# Patient Record
Sex: FEMALE | Race: WHITE | NOT HISPANIC OR LATINO | Employment: FULL TIME | URBAN - METROPOLITAN AREA
[De-identification: names, ages, dates, MRNs, and addresses within clinical notes are randomized per-mention and may not be internally consistent; named-entity substitution may affect disease eponyms.]

---

## 2017-01-20 DIAGNOSIS — C79.9 SECONDARY MALIGNANT NEOPLASM (HCC): ICD-10-CM

## 2017-01-30 ENCOUNTER — ALLSCRIPTS OFFICE VISIT (OUTPATIENT)
Dept: OTHER | Facility: OTHER | Age: 57
End: 2017-01-30

## 2017-02-01 ENCOUNTER — ALLSCRIPTS OFFICE VISIT (OUTPATIENT)
Dept: OTHER | Facility: OTHER | Age: 57
End: 2017-02-01

## 2017-03-01 DIAGNOSIS — C79.9 SECONDARY MALIGNANT NEOPLASM (HCC): ICD-10-CM

## 2017-03-02 ENCOUNTER — ALLSCRIPTS OFFICE VISIT (OUTPATIENT)
Dept: OTHER | Facility: OTHER | Age: 57
End: 2017-03-02

## 2017-04-02 DIAGNOSIS — C77.9 SECONDARY AND UNSPECIFIED MALIGNANT NEOPLASM OF LYMPH NODE, UNSPECIFIED (HCC): ICD-10-CM

## 2017-04-05 ENCOUNTER — ALLSCRIPTS OFFICE VISIT (OUTPATIENT)
Dept: OTHER | Facility: OTHER | Age: 57
End: 2017-04-05

## 2017-05-03 ENCOUNTER — ALLSCRIPTS OFFICE VISIT (OUTPATIENT)
Dept: OTHER | Facility: OTHER | Age: 57
End: 2017-05-03

## 2017-05-05 DIAGNOSIS — Z79.899 OTHER LONG TERM (CURRENT) DRUG THERAPY: ICD-10-CM

## 2017-05-05 DIAGNOSIS — C79.9 SECONDARY MALIGNANT NEOPLASM (HCC): ICD-10-CM

## 2017-05-24 ENCOUNTER — GENERIC CONVERSION - ENCOUNTER (OUTPATIENT)
Dept: OTHER | Facility: OTHER | Age: 57
End: 2017-05-24

## 2017-05-25 ENCOUNTER — GENERIC CONVERSION - ENCOUNTER (OUTPATIENT)
Dept: OTHER | Facility: OTHER | Age: 57
End: 2017-05-25

## 2017-06-01 ENCOUNTER — ALLSCRIPTS OFFICE VISIT (OUTPATIENT)
Dept: OTHER | Facility: OTHER | Age: 57
End: 2017-06-01

## 2017-06-28 ENCOUNTER — ALLSCRIPTS OFFICE VISIT (OUTPATIENT)
Dept: OTHER | Facility: OTHER | Age: 57
End: 2017-06-28

## 2017-06-28 DIAGNOSIS — C79.9 SECONDARY MALIGNANT NEOPLASM (HCC): ICD-10-CM

## 2017-07-01 DIAGNOSIS — C77.9 SECONDARY AND UNSPECIFIED MALIGNANT NEOPLASM OF LYMPH NODE, UNSPECIFIED (HCC): ICD-10-CM

## 2017-08-02 ENCOUNTER — ALLSCRIPTS OFFICE VISIT (OUTPATIENT)
Dept: OTHER | Facility: OTHER | Age: 57
End: 2017-08-02

## 2017-08-28 ENCOUNTER — GENERIC CONVERSION - ENCOUNTER (OUTPATIENT)
Dept: OTHER | Facility: OTHER | Age: 57
End: 2017-08-28

## 2017-09-02 DIAGNOSIS — C77.9 SECONDARY AND UNSPECIFIED MALIGNANT NEOPLASM OF LYMPH NODE, UNSPECIFIED (HCC): ICD-10-CM

## 2017-09-02 DIAGNOSIS — C79.9 SECONDARY MALIGNANT NEOPLASM (HCC): ICD-10-CM

## 2017-09-29 ENCOUNTER — ALLSCRIPTS OFFICE VISIT (OUTPATIENT)
Dept: OTHER | Facility: OTHER | Age: 57
End: 2017-09-29

## 2017-10-29 DIAGNOSIS — C79.9 SECONDARY MALIGNANT NEOPLASM (HCC): ICD-10-CM

## 2017-11-03 ENCOUNTER — GENERIC CONVERSION - ENCOUNTER (OUTPATIENT)
Dept: OTHER | Facility: OTHER | Age: 57
End: 2017-11-03

## 2017-12-01 ENCOUNTER — GENERIC CONVERSION - ENCOUNTER (OUTPATIENT)
Dept: OTHER | Facility: OTHER | Age: 57
End: 2017-12-01

## 2017-12-03 DIAGNOSIS — C77.9 SECONDARY AND UNSPECIFIED MALIGNANT NEOPLASM OF LYMPH NODE, UNSPECIFIED (CODE): ICD-10-CM

## 2017-12-03 DIAGNOSIS — C79.9 SECONDARY MALIGNANT NEOPLASM (HCC): ICD-10-CM

## 2017-12-29 ENCOUNTER — GENERIC CONVERSION - ENCOUNTER (OUTPATIENT)
Dept: OTHER | Facility: OTHER | Age: 57
End: 2017-12-29

## 2018-01-10 NOTE — MISCELLANEOUS
Message  Pt and spouse received nurse teach on pembro/keytruda on monday 2/8  Discussed mechanism of action on the drug, s/e she may experience including fatigue, nausea, appetite changes, dry skin/changes, rash, autoimmune s/e such as pneumonitits, colitis, hypo/hyperthyroidism, hyperglycemia  lab tests that will be monitored throughout trmt including CBC, CMP, BUN/Cr, liver functions, TSH  Pt first appt is this wed 2/10 for 1st trmt  She signed consent and [employer] asst forms  All questions answered today at appt and pt/spouse instructed to call the office with any question/concerns that come up at home and any time during trmt  Active Problems    1  Metastatic melanoma to lymph node (196 9,172  9) (C77 9,C43  9)    Current Meds   1  Advil 200 MG Oral Tablet; Take 1 tablet 3 times daily as needed Recorded   2  AmLODIPine Besylate 5 MG Oral Tablet; TAKE 1 TABLET DAILY FOR BLOOD   PRESSURE Recorded   3  Ondansetron HCl - 8 MG Oral Tablet; 1 po q 8 hours as needed for nausea and vomitting; Therapy: 19GUA5765 to (Last Rx:25Jan2016)  Requested for: 95BZX4288 Ordered    Allergies    1   Sulfa Drugs    Signatures   Electronically signed by : Keagan Monroy, ; Feb 9 2016  9:20AM EST                       (Author)

## 2018-01-12 VITALS
HEIGHT: 65 IN | OXYGEN SATURATION: 99 % | RESPIRATION RATE: 16 BRPM | WEIGHT: 173.5 LBS | TEMPERATURE: 96.4 F | DIASTOLIC BLOOD PRESSURE: 80 MMHG | SYSTOLIC BLOOD PRESSURE: 138 MMHG | BODY MASS INDEX: 28.91 KG/M2 | HEART RATE: 70 BPM

## 2018-01-12 NOTE — MISCELLANEOUS
Message  called patient to remind her labs needed to be completed before her visit on 1/30/17 voicemail was left      Active Problems    1  High risk medication use (V58 69) (Z79 899)   2  Metastatic melanoma (172 9) (C79 9)   3  Metastatic melanoma to lymph node (196 9,172  9) (C77 9)    Current Meds   1  Advil 200 MG Oral Tablet; Take 1 tablet 3 times daily as needed Recorded   2  AmLODIPine Besylate 5 MG Oral Tablet; TAKE 1 TABLET DAILY FOR BLOOD   PRESSURE Recorded   3  Hydrocortisone 10 MG Oral Tablet; 2 TAB  QAM AND 1 TAB QPM;   Therapy: (Recorded:02Mar2016) to Recorded   4  Indomethacin 50 MG Oral Capsule; TAKE 1 CAPSULE 3 TIMES DAILY WITH FOOD AS   NEEDED; Therapy: 08Aug2016 to (Chris Avendano)  Requested for: 52Elz0584; Last   Rx:08Aug2016 Ordered   5  Levothyroxine Sodium 100 MCG Oral Tablet; Take 1 tablet daily as directed Recorded   6  LORazepam 0 5 MG Oral Tablet (Ativan); TAKE 1 TABLET EVERY 4 TO 6 HOURS AS   NEEDED FOR NAUSEA; Therapy: 36LLS0374 to (Evaluate:01Mar2016); Last Rx:56Xca6638 Ordered   7  Magnesium CAPS; take 1 capsule daily; Therapy: (Penny Dyson) to Recorded   8  Mekinist 0 5 MG TABS; Take two tablets by mouth daily every other day one hour before   meal or two hours after meals; Therapy: 08Aug2016 to (Evaluate:78Oub5154)  Requested for: 69QSR0816; Last   Rx:17Jan2017 Ordered   9  Ondansetron HCl - 8 MG Oral Tablet; 1 po q 8 hours as needed for nausea and vomitting; Therapy: 42DZO6139 to (Last Rx:25Jan2016)  Requested for: 12YSW0215 Ordered   10  Pantoprazole Sodium 40 MG Oral Tablet Delayed Release; TAKE 1 TABLET 30    MINUTES BEFORE BREAKFAST DAILY; Therapy: (Recorded:68Dqh0125) to Recorded   11  Prochlorperazine Maleate 10 MG Oral Tablet; TAKE 1 TABLET EVERY 6 HOURS AS    NEEDED; Therapy: 96Riq5372 to (Evaluate:76Qwb2029)  Requested for: 05Dko6419; Last    Rx:70Ibc5006 Ordered   12  Tafinlar 75 MG CAPS; Take two capsules every 12 hrs every other day   One hour before    or two hours after meals; Therapy: 83Xgg6811 to (Last Rx:17Jan2017)  Requested for: 80NNW4590 Ordered    Allergies    1   Sulfa Drugs    Signatures   Electronically signed by : Erlin Beltran; Jan 26 2017  1:43PM EST                       (Author)

## 2018-01-13 VITALS
BODY MASS INDEX: 27.16 KG/M2 | HEIGHT: 65 IN | TEMPERATURE: 96.9 F | RESPIRATION RATE: 16 BRPM | HEART RATE: 80 BPM | WEIGHT: 163 LBS | DIASTOLIC BLOOD PRESSURE: 80 MMHG | OXYGEN SATURATION: 96 % | SYSTOLIC BLOOD PRESSURE: 118 MMHG

## 2018-01-13 VITALS
WEIGHT: 176 LBS | BODY MASS INDEX: 29.32 KG/M2 | OXYGEN SATURATION: 98 % | HEIGHT: 65 IN | SYSTOLIC BLOOD PRESSURE: 146 MMHG | TEMPERATURE: 97.6 F | HEART RATE: 84 BPM | RESPIRATION RATE: 16 BRPM | DIASTOLIC BLOOD PRESSURE: 82 MMHG

## 2018-01-13 VITALS
HEIGHT: 65 IN | DIASTOLIC BLOOD PRESSURE: 68 MMHG | SYSTOLIC BLOOD PRESSURE: 122 MMHG | OXYGEN SATURATION: 99 % | WEIGHT: 176 LBS | HEART RATE: 77 BPM | TEMPERATURE: 96.2 F | RESPIRATION RATE: 16 BRPM | BODY MASS INDEX: 29.32 KG/M2

## 2018-01-14 VITALS
HEIGHT: 65 IN | RESPIRATION RATE: 18 BRPM | BODY MASS INDEX: 27.38 KG/M2 | SYSTOLIC BLOOD PRESSURE: 130 MMHG | DIASTOLIC BLOOD PRESSURE: 78 MMHG | WEIGHT: 164.31 LBS | OXYGEN SATURATION: 99 % | HEART RATE: 76 BPM | TEMPERATURE: 96.1 F

## 2018-01-14 VITALS
DIASTOLIC BLOOD PRESSURE: 82 MMHG | TEMPERATURE: 97.1 F | RESPIRATION RATE: 18 BRPM | SYSTOLIC BLOOD PRESSURE: 126 MMHG | OXYGEN SATURATION: 98 % | WEIGHT: 169 LBS | HEART RATE: 74 BPM | HEIGHT: 65 IN | BODY MASS INDEX: 28.16 KG/M2

## 2018-01-14 VITALS
TEMPERATURE: 96.1 F | HEART RATE: 80 BPM | HEIGHT: 65 IN | WEIGHT: 175.5 LBS | OXYGEN SATURATION: 99 % | DIASTOLIC BLOOD PRESSURE: 74 MMHG | BODY MASS INDEX: 29.24 KG/M2 | RESPIRATION RATE: 16 BRPM | SYSTOLIC BLOOD PRESSURE: 122 MMHG

## 2018-01-15 VITALS
SYSTOLIC BLOOD PRESSURE: 124 MMHG | BODY MASS INDEX: 28.99 KG/M2 | HEIGHT: 65 IN | TEMPERATURE: 96.5 F | HEART RATE: 70 BPM | RESPIRATION RATE: 16 BRPM | WEIGHT: 174 LBS | OXYGEN SATURATION: 98 % | DIASTOLIC BLOOD PRESSURE: 78 MMHG

## 2018-01-16 NOTE — MISCELLANEOUS
Message  Linus Trinidad was admitted to [address] [place] [address] ICU overnight, physicians from [place] called to update Dr Ashwini Draper today  She started with fevers over the last couple days and aches  She fell at home and had some n/v last night  She was hypotensive and admitted to ICU, started on epinephrine drip   bret called this afternoon to make sure we were aware  He asked that we call his cell if need be, he will keep us informed  Active Problems    1  High risk medication use (V58 69) (Z79 899)   2  Metastatic melanoma (172 9) (C79 9)   3  Metastatic melanoma to lymph node (196 9,172  9) (C77 9)    Current Meds   1  Advil 200 MG Oral Tablet; Take 1 tablet 3 times daily as needed Recorded   2  AmLODIPine Besylate 5 MG Oral Tablet; TAKE 1 TABLET DAILY FOR BLOOD   PRESSURE Recorded   3  Hydrocortisone 10 MG Oral Tablet; 2 TAB  QAM AND 1 TAB QPM;   Therapy: (Recorded:02Mar2016) to Recorded   4  Indomethacin 50 MG Oral Capsule; TAKE 1 CAPSULE 3 TIMES DAILY WITH FOOD AS   NEEDED; Therapy: 54Crn3552 to (77 [phone])  Requested for: 49Yxd3355; Last   Rx:61Gxi1741 Ordered   5  Levothyroxine Sodium 100 MCG Oral Tablet; Take 1 tablet daily as directed Recorded   6  LORazepam 0 5 MG Oral Tablet (Ativan); TAKE 1 TABLET EVERY 4 TO 6 HOURS AS   NEEDED FOR NAUSEA; Therapy: 81OBJ0188 to (Evaluate:01Mar2016); Last Rx:84Qum9348 Ordered   7  Mekinist 2 MG TABS; TAKE 1 TABLET Daily take 1 hour before or 2 hours after meals; Therapy: 55Dvw7441 to (Evaluate:07Sep2016); Last Rx:49Xvw8285 Ordered   8  Ondansetron HCl - 8 MG Oral Tablet; 1 po q 8 hours as needed for nausea and vomitting; Therapy: 76SCD6569 to (Last Rx:25Jan2016)  Requested for: 02OQJ9989 Ordered   9  Pantoprazole Sodium 40 MG Oral Tablet Delayed Release; TAKE 1 TABLET 30   MINUTES BEFORE BREAKFAST DAILY; Therapy: (Recorded:33Sww8957) to Recorded   10  Prochlorperazine Maleate 10 MG Oral Tablet; TAKE 1 TABLET EVERY 6 HOURS AS    NEEDED;     Therapy: 50Ogn3059 to (Evaluate:17Lgn0127)  Requested for: 57Nzs0722; Last    Rx:82Hbw4907 Ordered   11  Tafinlar 75 MG CAPS; Take one capsule every 12 hrs by mouth  One hour before or two    hours after meals; Therapy: 52Zgx9258 to (Last Rx:21Zec9505) Ordered    Allergies    1   Sulfa Drugs    Signatures   Electronically signed by : Tali Maza, ; Aug 23 2016  3:33PM EST                       (Author)

## 2018-01-16 NOTE — MISCELLANEOUS
Message  Shila Perez or spouse, Mimi Albright will call me on monday 10/10 to let us know if she will be starting back on the BRAF combo or when she plans to start back up  She will go back on the Tafinlar 75mg BID every other day and Mekinist 1mg once/day every other day  They will stay in touch regarding any symptoms/side effects  Active Problems    1  High risk medication use (V58 69) (Z79 899)   2  Metastatic melanoma (172 9) (C79 9)   3  Metastatic melanoma to lymph node (196 9,172  9) (C77 9)    Current Meds   1  Advil 200 MG Oral Tablet; Take 1 tablet 3 times daily as needed Recorded   2  AmLODIPine Besylate 5 MG Oral Tablet; TAKE 1 TABLET DAILY FOR BLOOD   PRESSURE Recorded   3  Cotellic 20 MG Oral Tablet; TAKE 3 TABLET Daily; Therapy: 57AIX1420 to (Evaluate:07Oct2016); Last Rx:07Sep2016 Ordered   4  Hydrocortisone 10 MG Oral Tablet; 2 TAB  QAM AND 1 TAB QPM;   Therapy: (Recorded:02Mar2016) to Recorded   5  Indomethacin 50 MG Oral Capsule; TAKE 1 CAPSULE 3 TIMES DAILY WITH FOOD AS   NEEDED; Therapy: 59Mjv3476 to ([phone])  Requested for: 56Fxe5702; Last   Rx:61Xgg1105 Ordered   6  Levothyroxine Sodium 100 MCG Oral Tablet; Take 1 tablet daily as directed Recorded   7  LORazepam 0 5 MG Oral Tablet (Ativan); TAKE 1 TABLET EVERY 4 TO 6 HOURS AS   NEEDED FOR NAUSEA; Therapy: 61YRB5769 to (Evaluate:01Mar2016); Last Rx:15Otd4062 Ordered   8  Magnesium CAPS; take 1 capsule daily; Therapy: (Maura Qiu) to Recorded   9  Mekinist 2 MG TABS; TAKE 1 TABLET Daily take 1 hour before or 2 hours after meals; Therapy: 90Yrl2824 to (Evaluate:07Sep2016)  Requested for: 58Cyl3635; Last   Rx:48Arp0400; Status: ACTIVE - Renewal Denied Ordered   10  Ondansetron HCl - 8 MG Oral Tablet; 1 po q 8 hours as needed for nausea and vomitting; Therapy: 77JMI0158 to (Last Rx:25Jan2016)  Requested for: 33TJG5034 Ordered   11   Pantoprazole Sodium 40 MG Oral Tablet Delayed Release; TAKE 1 TABLET 30    MINUTES BEFORE BREAKFAST DAILY; Therapy: (Recorded:83Vin9089) to Recorded   12  Prochlorperazine Maleate 10 MG Oral Tablet; TAKE 1 TABLET EVERY 6 HOURS AS    NEEDED; Therapy: 08Aug2016 to (Evaluate:07Sep2016)  Requested for: 08Aug2016; Last    Rx:52Xbc0867 Ordered   13  Tafinlar 50 MG CAPS; Take 2 capsules by mouth every 12 hours  One hour before meal    or two hours after meals; Therapy: 08Aug2016 to (Last Rx:02Sep2016) Ordered   14  Zelboraf 240 MG Oral Tablet; TAKE 4 TABLET Every twelve hours; Therapy: 32RAT4355 to (Evaluate:07Oct2016); Last Rx:07Sep2016 Ordered    Allergies    1   Sulfa Drugs    Signatures   Electronically signed by : Lauri Vail, ; Oct  5 2016  1:33PM EST                       (Author)

## 2018-01-16 NOTE — PROGRESS NOTES
Assessment    1  Metastatic melanoma to lymph node (196 9,172  9) (C77 9,C43  9)    Plan  Metastatic melanoma to lymph node    · Follow-up visit in 2 weeks Evaluation and Treatment  Follow-up  Status: Hold For -  Scheduling  Requested for: 84KOB5805   Ordered; For: Metastatic melanoma to lymph node; Ordered By: Jessica Canchola Performed:  Due: 31MFT2243                (1) PHOSPHORUS; Status:Active; Requested for:71Guw5099;   Perform:[facility]; XH:12APM8035;FGHXNLI; For:Metastatic melanoma to lymph node; Ordered By:Anitra Garcia;      Discussion/Summary  Discussion Summary:   Ms Sedrick Rivas is a 48year-old female with stage III see malignant melanoma of the right lower extremity, status post wide excision, with a sentinel lymph node biopsy, followed by a right inguinal dissection  Tyra Ceron, our clinical trials nurse, saw her with me  She would be due for week 13 of treatment  She recently had a PET/CT which, unfortunately, showed diffuse disease progression in her lung, mediastinal/hilar adenopathy, abdomen, right medial tibia, and bilateral lower femurs  We will need to take her off of the clinical trial and will see whether she was randomized to Ipilimumab or Nivolumab  We would then treat her with whichever drug she was not getting on the clinical trial  This was discussed at great length with her and her   All of her questions were answered  She will tentatively follow up with us in 2 weeks  Chief Complaint  Chief Complaint: Chief Complaint:   The patient presents to the office today with Metastatic melanoma to lymph node  Chief Complaint Free Text Note Form: Metastatic melanoma to lymph node      History of Present Illness  Referring Provider: Dr Kiley Kathleen   HPI: Mrs Sedrick Rivas is a 48year-old female who presents to us for possible participation in our adjuvant clinical trial of Ipilimumab vs  Nivolumab   Mrs Sedrick Rivas initially noticed a mole on her foot in December, however she felt it was a regular mole at that time  By spring the mole had grown and changed in appearance  She also had a few episodes where 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 had 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 causing the lesion to bleed  She presented to her dermatologist who debrided and resected the lesion  This was positive for malignant melanoma and had a Jimbo's level IV lesion with maximum thickness of 2 35 mm, it was ulcerated, and had a mitotic rate of 13 mm/m2  There was no evidence of lymphovascular invasion  On 7/28/15 she had a wide excision with sentinel lymph node biopsy 1/2 lymph nodes were positive  She subsequently had a PET/CT on 8/5/15, which did not reveal any distant metastatic disease, however she did have a hypermetabolic right popliteal fossa lymph node  On 8/13/15 she had a right groin lymph node dissection, 1/6 of the superficial groin nodes was positive for macrometastatic disease  All other lymph nodes removed were negative  The patient has stage IIIC disease  he patient was in the process of being worked up for the [facility] [id number] clinical trial looking a Ipilimumab vs  Nivolumab in the adjuvant setting as she does have stage IIIC disease  A PET/CT scan, part of the staging requirements for the clinical trial, showed hypermetabolic activity posterior to the distal right femur  On 10/1/15 She had a resection of the femoral deep nodule and it was consistent with malignant melanoma  Previous Therapy:   7/9/15 resection of lesion revealing malignant melanoma, Jimbo'es level IV lesion maximum thickness of 2 35 mm, ulcerated, mitotic rate of 13 mm/m2  7/28/15 wide excision of foot and right groin sentinel lymph node biopsy 1/2 lymph nodes were positive  8/13/15: right groin lymph node dissection 1/6 right superficial groin nodes were positive   All other nodes were negative  10/1/15 Radical resection of malignant soft tissue neoplasm of distal deep femoral region   Current Therapy: [facility] [place] [facility] 209-238 adjuvant clinical trial with Ipilimumab vs  Nivolumab, first treatment was 11/2/15   Disease Status: Stage IIIC, [id number], significant disease progression   Interval History: Ms Maude Lemus is a 48year old female with a stage IIIc melanoma of the right lower extremity, status post wide excision right inguinal sentinel lymph node biopsy on 7/28/15, 1/2 lymph nodes were positive  Overall Ms Maude Lemus is doing well  She has a good energy level with an ECOG performance status of one  She has been sick for the past week now, she is getting better, but did have chills, pyrexia, muscles cramping  Her pyrexia has resolved  She also has not had much of an appetite and she has not been eating  She is slightly nauseated but has not been taking anything  She also notes 2 episodes of diarrhea on Saturday morning, she took Imodium and the diarrhea resolved  She otherwise denies fevers, chills, CP, SOB, N/V, diarrhea, all other ROS are unremarkable  Tumor Markers: BRAF not detected      Review of Systems  Complete-Female:   Constitutional: fever, recent ~Ulb weight gain, chills, recent ~Ulb weight loss, loss of appetite and night sweats, but as noted in HPI and not feeling tired  Eyes: No complaints of eye pain, no red eyes, no eyesight problems, no discharge, no dry eyes, no itching of eyes  ENT: no complaints of earache, no loss of hearing, no nose bleeds, no nasal discharge, no sore throat, no hoarseness  Cardiovascular: No complaints of slow heart rate, no fast heart rate, no chest pain, no palpitations, no leg claudication, no lower extremity edema  Respiratory: No complaints of shortness of breath, no wheezing, no cough, no SOB on exertion, no orthopnea, no PND  Gastrointestinal: nausea and diarrhea, but as noted in HPI  Genitourinary: No complaints of dysuria, no incontinence, no pelvic pain, no dysmenorrhea, no vaginal discharge or bleeding     Musculoskeletal: limb pain and surgical site, but No complaints of arthralgias, no myalgias, no joint swelling or stiffness, no limb pain or swelling  Integumentary: itching and bilateral temples/forehead, but as noted in HPI  Neurological: dizziness, but No complaints of headache, no confusion, no convulsions, no numbness, no dizziness or fainting, no tingling, no limb weakness, no difficulty walking and no limb weakness  Psychiatric: Not suicidal, no sleep disturbance, no anxiety or depression, no change in personality, no emotional problems  Endocrine: hot flashes, but No complaints of proptosis, no hot flashes, no muscle weakness, no deepening of the voice, no feelings of weakness  Hematologic/Lymphatic: a tendency for easy bruising, but No complaints of swollen glands, no swollen glands in the neck, does not bleed easily, does not bruise easily  Other Symptoms: Bilateral hip pain  ROS Reviewed:   ROS reviewed  Active Problems    1  Metastatic melanoma to lymph node (196 9,172  9) (C77 9,C43  9)    Past Medical History    1  History of hypertension (V12 59) (Z86 79)   2  History of Palpitation (785 1) (R00 2)    Surgical History    1  History of  Section   2  History of Cholecystotomy   3  History of Complete Colonoscopy   4  History of Leg Repair  Surgical History Reviewed: The surgical history was reviewed and updated today  Family History    1  Family history of diabetes mellitus (V18 0) (Z83 3)    2  Family history of cardiac disorder (V17 49) (Z82 49)   3  Family history of cerebrovascular accident (V17 1) (Z82 3)   4  Family history of hypertension (V17 49) (Z82 49)  Family History Reviewed: The family history was reviewed and updated today  Social History    · Current every day smoker (305 1) (F17 200)   · Social alcohol use (F10 99)  Social History Reviewed: The social history was reviewed and updated today  The social history was reviewed and is unchanged  Current Meds   1  Advil 200 MG Oral Tablet;  Take 1 tablet 3 times daily as needed Recorded 2  AmLODIPine Besylate 5 MG Oral Tablet; TAKE 1 TABLET DAILY FOR BLOOD PRESSURE   Recorded  Medication List Reviewed: The medication list was reviewed and updated today  Allergies    1  Sulfa Drugs    Vitals  Vital Signs [Data Includes: Current Encounter]    Recorded: 21CXQ0378 09:52AM   Temperature 97 1 F   Heart Rate 79   Respiration 18   Systolic 238   Diastolic 80   Height 5 ft 5 in   Weight 174 lb 4 96 oz   BMI Calculated 29 01   BSA Calculated 1 87   O2 Saturation 98   Pain Scale 0     Physical Exam    Constitutional   General appearance: No acute distress, well appearing and well nourished  Eyes   Conjunctiva and lids: No swelling, erythema or discharge  Pupils and irises: Equal, round and reactive to light  Ears, Nose, Mouth, and Throat   External inspection of ears and nose: Normal     Nasal mucosa, septum, and turbinates: Normal without edema or erythema  Oropharynx: Normal with no erythema, edema, exudate or lesions  Pulmonary   Respiratory effort: No increased work of breathing or signs of respiratory distress  Auscultation of lungs: Clear to auscultation  Cardiovascular   Palpation of heart: Normal PMI, no thrills  Auscultation of heart: Normal rate and rhythm, normal S1 and S2, without murmurs  Examination of extremities for edema and/or varicosities: Normal   Right leg lymphedema, stable  Abdomen   Abdomen: Non-tender, no masses  Liver and spleen: No hepatomegaly or splenomegaly  Lymphatic   Palpation of lymph nodes in neck: No lymphadenopathy  Musculoskeletal   Gait and station: Normal     Digits and nails: Normal without clubbing or cyanosis  Inspection/palpation of joints, bones, and muscles: Normal     Skin   Skin and subcutaneous tissue: Abnormal   Many scars and surgical incisions on the right leg  Neurologic   Cranial nerves: Cranial nerves 2-12 intact  Sensation: No sensory loss      Psychiatric   Orientation to person, place, and time: Normal     Mood and affect: Normal         ECOG 0       Results/Data  Results Form:   Results   Radiology 1/19/16 MRI of brain: stable scattered white matter lesions within the cerebral hemispheres, non-specific for the patient's age  No new lesions are identified  Lab Results 12/28/15 labs were reviewed TSH was slightly elevated at 5 952  PET 1/18/16: New bilateral lung nodules most compatible with metastases  New extensive hypermetabolic hilar and mediastinal adenopathy  Mildly hypermetabolic cervical nodes are also concerning for metastases given the extent of new disease elsewhere in the body  New hypermetabolic lesion in the upper abdomen most compatible with metastasis  Significantly increased subcutaneous activity along the medial right tibia region, most concerning for tumor recurrence  Additional increased FDG activity is seen in the lower extremities as above  Increased subcutaneous activity along the forearms bilaterally maybe correlated clinically  Increased activity in the distal femurs bilaterally and region of the left 1st anterior costochondral junction along the manubrium  Signatures   Electronically signed by :  ENOC Mera ; Jan 25 2016 10:50AM EST                       (Author)

## 2018-01-22 VITALS
TEMPERATURE: 96.2 F | SYSTOLIC BLOOD PRESSURE: 122 MMHG | RESPIRATION RATE: 16 BRPM | HEIGHT: 65 IN | HEART RATE: 71 BPM | OXYGEN SATURATION: 96 % | DIASTOLIC BLOOD PRESSURE: 82 MMHG | WEIGHT: 171.5 LBS | BODY MASS INDEX: 28.57 KG/M2

## 2018-01-22 VITALS
DIASTOLIC BLOOD PRESSURE: 76 MMHG | TEMPERATURE: 95.9 F | HEART RATE: 77 BPM | RESPIRATION RATE: 16 BRPM | OXYGEN SATURATION: 95 % | WEIGHT: 175 LBS | HEIGHT: 65 IN | BODY MASS INDEX: 29.16 KG/M2 | SYSTOLIC BLOOD PRESSURE: 122 MMHG

## 2018-01-24 VITALS
WEIGHT: 171.5 LBS | BODY MASS INDEX: 28.57 KG/M2 | HEIGHT: 65 IN | SYSTOLIC BLOOD PRESSURE: 110 MMHG | RESPIRATION RATE: 16 BRPM | HEART RATE: 74 BPM | DIASTOLIC BLOOD PRESSURE: 68 MMHG | OXYGEN SATURATION: 98 % | TEMPERATURE: 96.4 F

## 2018-01-24 VITALS
WEIGHT: 172 LBS | TEMPERATURE: 96.5 F | SYSTOLIC BLOOD PRESSURE: 128 MMHG | OXYGEN SATURATION: 96 % | RESPIRATION RATE: 16 BRPM | HEIGHT: 65 IN | BODY MASS INDEX: 28.66 KG/M2 | HEART RATE: 85 BPM | DIASTOLIC BLOOD PRESSURE: 68 MMHG

## 2018-01-29 DIAGNOSIS — C77.9 SECONDARY AND UNSPECIFIED MALIGNANT NEOPLASM OF LYMPH NODE, UNSPECIFIED (CODE): ICD-10-CM

## 2018-01-31 NOTE — PROGRESS NOTES
Hematology/Oncology Outpatient Follow- up Note  Roger Cesar 65 y o  female MRN: @ Encounter: [id number]        Date:  1/31/2018        Assessment / Plan:    1  Metastatic Melanoma: currently on Dabrafenib 150 mg BID along with Mekinist 1 mg PO daily every other day  Thus far she has tolerated this regimen well (she had trouble with full dose BRAF/MEK combination- had fevers, arthralgias, etc )  She will be due for repeat imaging this month (we have decided to scan her every 3 months), which I have ordered  Her labs were reviewed and are sufficient for treatment  2  Adrenal insufficiency: being followed by [place] [facility]: she is currently on Hydrocortisone- 30 mg        Subjective:   CC: follow up regarding metastatic melanoma      HPI:    Mrs Cristina Mohr is a 65year-old female who presents to us for possible participation in our adjuvant clinical trial of Ipilimumab vs  Nivolumab  Mrs Cristina Mohr initially noticed a mole on her foot in December, however she felt it was a regular mole at that time  By spring the mole had grown and changed in appearance  She also had a few episodes where 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 had 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 causing the lesion to bleed  She presented to her dermatologist who debrided and resected the lesion  This was positive for malignant melanoma and had a Jimbo's level IV lesion with maximum thickness of 2 35 mm, it was ulcerated, and had a mitotic rate of 13 mm/m2  There was no evidence of lymphovascular invasion  On 7/28/15 she had a wide excision with sentinel lymph node biopsy 1/2 lymph nodes were positive  She subsequently had a PET/CT on 8/5/15, which did not reveal any distant metastatic disease, however she did have a hypermetabolic right popliteal fossa lymph node  On 8/13/15 she had a right groin lymph node dissection, 1/6 of the superficial groin nodes was positive for macrometastatic disease  All other lymph nodes removed were negative  The patient has stage IIIC disease  he patient was in the process of being worked up for the [facility] 209-238 clinical trial looking a Ipilimumab vs  Nivolumab in the adjuvant setting as she does have stage IIIC disease  A PET/CT scan, part of the staging requirements for the clinical trial, showed hypermetabolic activity posterior to the distal right femur  On 10/1/15 She had a resection of the femoral deep nodule and it was consistent with malignant melanoma  The patient was found to have recurrent disease in January 2016, she was unblinded from the [facility] 209-238 clinical trial and was randomized to Ipilimumab  She started on Pembrolizumab on 2/10/16  She was found to have disease progression again and started treatment with Dabrafenib and Mekinist on 8/8/16, she unfortunately had a lot of issues with pyrexia  We switched her to Vemurafenib along with Cotellic on 0/27/40  She developed high fevers, rash, nausea, and vomiting  In October 2016 we switched her back to Dabrafenib 75 mg BID along with Mekinist 1 mg PO every other day  In November 2016  Interval History:    Overall she is doing well  She has a good energy level with an ECOG performance status of zero  She has a good appetite and has not lost any weight  She did have sinusitis a few weeks ago, which has since resolved  She has a full ache in her left femur/groin that radiates to her knee  It has come and gone since October  She has not needed to take pain medication  She otherwise denies fevers, chills, CP, SOB ,N/V, diarrhea, all other ROS are unremarkable  PFSH was reviewed        Cancer Staging: metastatic melanoma to her lung, liver    BRAF: mutated    Previous Hematologic/ Oncologic History:    7/9/15 resection of lesion revealing malignant melanoma, Jimbo'es level IV lesion maximum thickness of 2 35 mm, ulcerated, mitotic rate of 13 mm/m2  1  7/28/15 wide excision of foot and right groin sentinel lymph node biopsy 1/2 lymph nodes were positive  2  8/13/15: right groin lymph node dissection 1/6 right superficial groin nodes were positive  All other nodes were negative  3  10/1/15 Radical resection of malignant soft tissue neoplasm of distal deep femoral region  4  -238 adjuvant clinical trial with Ipilimumab vs  Nivolumab, first treatment was 11/2/15, randomization was broken in January 2016, she was receiving Ipilimumab  5  2/12/16 adrenal insufficiency and hypophysitis- started hydrocortisone 20 mg in the am, 10 mg in the pm  Pembrolizumab 2 mg/kg every three weeks, first treatment was on 2/10/16, last dose was on 7/18/16  6  STarted Dabrafenib 150 mg every 12 hours PO along with Mekinist 2 mg PO daily on 8/8/16, stopped on 8/22/16, resumed on 9/2/16 at dose reduction: Dabrafenib 75 mg BID along with Mekinist 2 mg PO daily, drug stopped on 9/3/16 secondary to nausea, vomiting, and pyrexia  7  STarted Vemurafenib 960 mg PO every 12 hours along with Cotellic 60 mg PO daily three weeks on and one week off on 9/16/16, stopped on 9/27/16 secondary to nausea, vomiting, grade 4 maculopapular rash, and high fevers  8 October 24, 2016 Started Dabrafenib 75 mg BID, Mekinist 1 mg PO every other day    Current Hematologic/ Oncologic Treatment:      Dabrafenib 150 mg BID plus Mekinist 1 mg PO daily every other day, started this regimen in November 2016        Test Results:    Imaging: No results found      Labs:   Lab Results   Component Value Date    WBC 7 50 08/08/2016    HGB 13 3 08/08/2016    HCT 41 3 08/08/2016    MCV 89 08/08/2016     08/08/2016     Lab Results   Component Value Date     08/08/2016    K 3 8 08/08/2016     08/08/2016    CO2 33 (H) 08/08/2016    ANIONGAP 8 08/08/2016    BUN 5 08/08/2016    CREATININE 0 65 08/08/2016    GLUCOSE 100 08/08/2016    CALCIUM 9 2 08/08/2016    AST 12 08/08/2016    ALT 14 08/08/2016    ALKPHOS 83 08/08/2016    PROT 7 2 08/08/2016    BILITOT 0 50 08/08/2016    EGFR >60 0 08/08/2016         No results found for: SPEP, UPEP    No results found for: PSA    No results found for: CEA    No results found for:     No results found for: AFP    No results found for: IRON, TIBC, FERRITIN    No results found for: [id number]      ROS: Review of Systems   Constitutional: Negative  HENT: Negative  Eyes: Negative  Respiratory: Negative  Cardiovascular: Negative  Gastrointestinal: Negative  Endocrine: Negative  Genitourinary: Negative  Musculoskeletal: Negative  Skin: Negative  Allergic/Immunologic: Negative  Neurological: Negative  Hematological: Negative  Psychiatric/Behavioral: Negative  Current Medications: Reviewed  Allergies: Reviewed  PMH/FH/SH:  Reviewed      Physical Exam:    There is no height or weight on file to calculate BSA  Wt Readings from Last 3 Encounters:   12/29/17 78 kg (172 lb)   12/01/17 77 8 kg (171 lb 8 oz)   11/03/17 77 8 kg (171 lb 8 oz)        Temp Readings from Last 3 Encounters:   12/29/17 (!) 96 5 °F (35 8 °C)   12/01/17 (!) 96 4 °F (35 8 °C)   11/03/17 (!) 96 2 °F (35 7 °C)        BP Readings from Last 3 Encounters:   12/29/17 128/68   12/01/17 110/68   11/03/17 122/82         Pulse Readings from Last 3 Encounters:   12/29/17 85   12/01/17 74   11/03/17 71     @LASTSAO2(3)@      Physical Exam   Constitutional: She is oriented to person, place, and time  She appears well-developed and well-nourished  HENT:   Head: Normocephalic and atraumatic  Nose: Nose normal    Mouth/Throat: Oropharynx is clear and moist    Eyes: Conjunctivae and EOM are normal  Pupils are equal, round, and reactive to light  Neck: Normal range of motion  Neck supple  Cardiovascular: Normal rate, regular rhythm and normal heart sounds  Pulmonary/Chest: Effort normal and breath sounds normal    Abdominal: Soft  Bowel sounds are normal    Musculoskeletal: Normal range of motion  Neurological: She is alert and oriented to person, place, and time  She has normal reflexes  Skin: Skin is warm and dry  Psychiatric: She has a normal mood and affect  Judgment normal    Vitals reviewed  Goals and Barriers:  Current Goal:  Prolong Survival from Cancer  Barriers: None  Patient's Capacity to Self Care:  Patient fully able to self care      Code Status: [unfilled]  Advance Directive and Living Will:      Power of :

## 2018-02-02 ENCOUNTER — OFFICE VISIT (OUTPATIENT)
Dept: HEMATOLOGY ONCOLOGY | Facility: CLINIC | Age: 58
End: 2018-02-02
Payer: COMMERCIAL

## 2018-02-02 VITALS
WEIGHT: 173 LBS | HEIGHT: 64 IN | RESPIRATION RATE: 14 BRPM | BODY MASS INDEX: 29.53 KG/M2 | OXYGEN SATURATION: 98 % | TEMPERATURE: 97.7 F | SYSTOLIC BLOOD PRESSURE: 132 MMHG | HEART RATE: 68 BPM | DIASTOLIC BLOOD PRESSURE: 80 MMHG

## 2018-02-02 DIAGNOSIS — E27.1 ADRENAL INSUFFICIENCY, PRIMARY, AUTOIMMUNE (HCC): ICD-10-CM

## 2018-02-02 DIAGNOSIS — C79.9 METASTATIC MELANOMA (HCC): Primary | ICD-10-CM

## 2018-02-02 PROCEDURE — 99215 OFFICE O/P EST HI 40 MIN: CPT | Performed by: PHYSICIAN ASSISTANT

## 2018-03-01 NOTE — PROGRESS NOTES
Hematology/Oncology Outpatient Follow- up Note  Ana Kaur 65 y o  female MRN: @ Encounter: [id number]        Date:  3/1/2018        Assessment / Plan:    1  Metastatic Melanoma: currently on Dabrafenib 150 mg BID along with Mekinist 1 mg PO daily every other day  Thus far she has tolerated this regimen well (she had trouble with full dose BRAF/MEK combination- had fevers, arthralgias, etc )  She had repeat imaging on 2/20/18, which continues to show stable disease  Her next set of scans will be in May  Her labs were reviewed and are sufficient for treatment  She will follow up with us in one month with a CBC, CMP, and LDH  2  Adrenal insufficiency: being followed by [place] [facility]: she is currently on Hydrocortisone- 30 mg        Subjective:   CC: follow up regarding metastatic melanoma      HPI:    Mrs Samara iVgil is a 65year-old female who presents to us for possible participation in our adjuvant clinical trial of Ipilimumab vs  Nivolumab  Mrs Samara Vigil initially noticed a mole on her foot in December, however she felt it was a regular mole at that time  By spring the mole had grown and changed in appearance  She also had a few episodes where 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 had 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 causing the lesion to bleed  She presented to her dermatologist who debrided and resected the lesion  This was positive for malignant melanoma and had a Jimbo's level IV lesion with maximum thickness of 2 35 mm, it was ulcerated, and had a mitotic rate of 13 mm/m2  There was no evidence of lymphovascular invasion  On 7/28/15 she had a wide excision with sentinel lymph node biopsy 1/2 lymph nodes were positive  She subsequently had a PET/CT on 8/5/15, which did not reveal any distant metastatic disease, however she did have a hypermetabolic right popliteal fossa lymph node  On 8/13/15 she had a right groin lymph node dissection, 1/6 of the superficial groin nodes was positive for macrometastatic disease   All other lymph nodes removed were negative  The patient has stage IIIC disease  he patient was in the process of being worked up for the [facility] 209-238 clinical trial looking a Ipilimumab vs  Nivolumab in the adjuvant setting as she does have stage IIIC disease  A PET/CT scan, part of the staging requirements for the clinical trial, showed hypermetabolic activity posterior to the distal right femur  On 10/1/15 She had a resection of the femoral deep nodule and it was consistent with malignant melanoma  The patient was found to have recurrent disease in January 2016, she was unblinded from the [facility] 209-238 clinical trial and was randomized to Ipilimumab  She started on Pembrolizumab on 2/10/16  She was found to have disease progression again and started treatment with Dabrafenib and Mekinist on 8/8/16, she unfortunately had a lot of issues with pyrexia  We switched her to Vemurafenib along with Cotellic on 7/10/53  She developed high fevers, rash, nausea, and vomiting  In October 2016 we switched her back to Dabrafenib 75 mg BID along with Mekinist 1 mg PO every other day  In November 2016  Interval History:    Overall Chad Byrne is doing well  She has a good energy level with an ECOG performance status of zero  She has a good appetite and her weight has jenn stable  She reports that her prior thigh pain is better, she started wearing her orthotics, which has helped  She denies fevers, chills, CP, SOb, N/V, diarrhea, all other ROS are unremarkable  PFSH was reviewed  Cancer Staging:   metastatic melanoma to her lung, liver    BRAF: mutated    Previous Hematologic/ Oncologic History:    7/9/15 resection of lesion revealing malignant melanoma, Jimbo'es level IV lesion maximum thickness of 2 35 mm, ulcerated, mitotic rate of 13 mm/m2  1  7/28/15 wide excision of foot and right groin sentinel lymph node biopsy 1/2 lymph nodes were positive  2  8/13/15: right groin lymph node dissection 1/6 right superficial groin nodes were positive   All other nodes were negative  3  10/1/15 Radical resection of malignant soft tissue neoplasm of distal deep femoral region  4  -834 adjuvant clinical trial with Ipilimumab vs  Nivolumab, first treatment was 11/2/15, randomization was broken in January 2016, she was receiving Ipilimumab  5  2/12/16 adrenal insufficiency and hypophysitis- started hydrocortisone 20 mg in the am, 10 mg in the pm  Pembrolizumab 2 mg/kg every three weeks, first treatment was on 2/10/16, last dose was on 7/18/16  6  STarted Dabrafenib 150 mg every 12 hours PO along with Mekinist 2 mg PO daily on 8/8/16, stopped on 8/22/16, resumed on 9/2/16 at dose reduction: Dabrafenib 75 mg BID along with Mekinist 2 mg PO daily, drug stopped on 9/3/16 secondary to nausea, vomiting, and pyrexia  7  STarted Vemurafenib 960 mg PO every 12 hours along with Cotellic 60 mg PO daily three weeks on and one week off on 9/16/16, stopped on 9/27/16 secondary to nausea, vomiting, grade 4 maculopapular rash, and high fevers  8 October 24, 2016 Started Dabrafenib 75 mg BID, Mekinist 1 mg PO every other day    Current Hematologic/ Oncologic Treatment:    Dabrafenib 150 mg BID plus Mekinist 1 mg PO daily every other day, started this regimen in November 2016          Test Results:    Imaging: No results found      Labs:   Lab Results   Component Value Date    WBC 7 50 08/08/2016    HGB 13 3 08/08/2016    HCT 41 3 08/08/2016    MCV 89 08/08/2016     08/08/2016     Lab Results   Component Value Date     08/08/2016    K 3 8 08/08/2016     08/08/2016    CO2 33 (H) 08/08/2016    ANIONGAP 8 08/08/2016    BUN 5 08/08/2016    CREATININE 0 65 08/08/2016    GLUCOSE 100 08/08/2016    CALCIUM 9 2 08/08/2016    AST 12 08/08/2016    ALT 14 08/08/2016    ALKPHOS 83 08/08/2016    PROT 7 2 08/08/2016    BILITOT 0 50 08/08/2016    EGFR >60 0 08/08/2016         No results found for: SPEP, UPEP    No results found for: PSA    No results found for: CEA    No results found for:     No results found for: AFP    No results found for: IRON, TIBC, FERRITIN    No results found for: [id number]      ROS: Review of Systems   Constitutional: Negative  HENT: Negative  Eyes: Negative  Respiratory: Negative  Cardiovascular: Negative  Gastrointestinal: Negative  Endocrine: Negative  Genitourinary: Negative  Musculoskeletal: Negative  Skin: Negative  Allergic/Immunologic: Negative  Neurological: Negative  Hematological: Negative  Psychiatric/Behavioral: Negative  Current Medications: Reviewed  Allergies: Reviewed  PMH/FH/SH:  Reviewed      Physical Exam:    There is no height or weight on file to calculate BSA  Wt Readings from Last 3 Encounters:   02/02/18 78 5 kg (173 lb)   12/29/17 78 kg (172 lb)   12/01/17 77 8 kg (171 lb 8 oz)        Temp Readings from Last 3 Encounters:   02/02/18 97 7 °F (36 5 °C)   12/29/17 (!) 96 5 °F (35 8 °C)   12/01/17 (!) 96 4 °F (35 8 °C)        BP Readings from Last 3 Encounters:   02/02/18 132/80   12/29/17 128/68   12/01/17 110/68         Pulse Readings from Last 3 Encounters:   02/02/18 68   12/29/17 85   12/01/17 74     @LASTSAO2(3)@      Physical Exam   Constitutional: She is oriented to person, place, and time  She appears well-developed and well-nourished  HENT:   Head: Normocephalic and atraumatic  Nose: Nose normal    Mouth/Throat: Oropharynx is clear and moist    Eyes: Conjunctivae and EOM are normal  Pupils are equal, round, and reactive to light  Neck: Normal range of motion  Neck supple  Cardiovascular: Normal rate, regular rhythm and normal heart sounds  Pulmonary/Chest: Effort normal and breath sounds normal    Abdominal: Soft  Bowel sounds are normal    Musculoskeletal: Normal range of motion  Neurological: She is alert and oriented to person, place, and time  She has normal reflexes  Skin: Skin is warm and dry  Psychiatric: She has a normal mood and affect   Judgment normal  Vitals reviewed  Goals and Barriers:  Current Goal: Prolong Survival from Cancer  Barriers: None  Patient's Capacity to Self Care:  Patient fully able to self care      Code Status: [unfilled]  Advance Directive and Living Will:      Power of :

## 2018-03-02 ENCOUNTER — OFFICE VISIT (OUTPATIENT)
Dept: HEMATOLOGY ONCOLOGY | Facility: CLINIC | Age: 58
End: 2018-03-02
Payer: COMMERCIAL

## 2018-03-02 VITALS
RESPIRATION RATE: 15 BRPM | OXYGEN SATURATION: 95 % | SYSTOLIC BLOOD PRESSURE: 120 MMHG | WEIGHT: 174.5 LBS | HEIGHT: 65 IN | BODY MASS INDEX: 29.07 KG/M2 | HEART RATE: 67 BPM | DIASTOLIC BLOOD PRESSURE: 86 MMHG | TEMPERATURE: 97.3 F

## 2018-03-02 DIAGNOSIS — C78.01 METASTATIC MELANOMA TO LUNG, RIGHT (HCC): ICD-10-CM

## 2018-03-02 DIAGNOSIS — C78.7 METASTATIC MELANOMA TO LIVER (HCC): ICD-10-CM

## 2018-03-02 DIAGNOSIS — C79.9 METASTATIC MELANOMA (HCC): Primary | ICD-10-CM

## 2018-03-02 PROCEDURE — 99215 OFFICE O/P EST HI 40 MIN: CPT | Performed by: SPECIALIST

## 2018-03-05 ENCOUNTER — TELEPHONE (OUTPATIENT)
Dept: HEMATOLOGY ONCOLOGY | Facility: CLINIC | Age: 58
End: 2018-03-05

## 2018-03-23 DIAGNOSIS — C43.9 MALIGNANT MELANOMA, UNSPECIFIED SITE (HCC): Primary | ICD-10-CM

## 2018-04-04 NOTE — PROGRESS NOTES
Hematology/Oncology Outpatient Follow- up Note  Lilyan Babinski 65 y o  female MRN: @ Encounter: [id number]        Date:  4/4/2018        Assessment / Plan:    1  Metastatic Melanoma: currently on Dabrafenib 150 mg BID along with Mekinist 1 mg PO daily every other day  Thus far she has tolerated this regimen well (she had trouble with full dose BRAF/MEK combination- had fevers, arthralgias, etc )  She had repeat imaging on 2/20/18, which continues to show stable disease  Her next set of scans will be in May  Her labs were reviewed and are sufficient for treatment  She will follow up with us in one month with a CBC, CMP, and LDH  2  Adrenal insufficiency: being followed by [place] [facility]: she is currently on Hydrocortisone- 25 mg        Subjective:   CC: follow up regarding melanoma      HPI:    Mrs Aj Oliver is a 65year-old female who presents to us for possible participation in our adjuvant clinical trial of Ipilimumab vs  Nivolumab  Mrs Aj Oliver initially noticed a mole on her foot in December, however she felt it was a regular mole at that time  By spring the mole had grown and changed in appearance  She also had a few episodes where 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 had 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 causing the lesion to bleed  She presented to her dermatologist who debrided and resected the lesion  This was positive for malignant melanoma and had a Jimbo's level IV lesion with maximum thickness of 2 35 mm, it was ulcerated, and had a mitotic rate of 13 mm/m2  There was no evidence of lymphovascular invasion  On 7/28/15 she had a wide excision with sentinel lymph node biopsy 1/2 lymph nodes were positive  She subsequently had a PET/CT on 8/5/15, which did not reveal any distant metastatic disease, however she did have a hypermetabolic right popliteal fossa lymph node  On 8/13/15 she had a right groin lymph node dissection, 1/6 of the superficial groin nodes was positive for macrometastatic disease   All other lymph nodes removed were negative  The patient has stage IIIC disease  he patient was in the process of being worked up for the [facility] 209-238 clinical trial looking a Ipilimumab vs  Nivolumab in the adjuvant setting as she does have stage IIIC disease  A PET/CT scan, part of the staging requirements for the clinical trial, showed hypermetabolic activity posterior to the distal right femur  On 10/1/15 She had a resection of the femoral deep nodule and it was consistent with malignant melanoma  The patient was found to have recurrent disease in January 2016, she was unblinded from the [facility] 209-238 clinical trial and was randomized to Ipilimumab  She started on Pembrolizumab on 2/10/16  She was found to have disease progression again and started treatment with Dabrafenib and Mekinist on 8/8/16, she unfortunately had a lot of issues with pyrexia  We switched her to Vemurafenib along with Cotellic on 5/43/46  She developed high fevers, rash, nausea, and vomiting  In October 2016 we switched her back to Dabrafenib 75 mg BID along with Mekinist 1 mg PO every other day  In November 2016  Interval History:    Overall the patient is doing well  She has a good energy level with an ECOG performance status of zero  She has a good appetite and her weight has been stable  She did lower her Prednisone dosage to 25 mg and is feeling well  She will stay at the 25 mg dosage for at least one month  She denies fevers, chills, CP, SOB, N/V, diarrhea, all other ROS are unremarkable  PFSH was reviewed        Cancer Staging: metastatic melanoma to her lung, liver    BRAF: mutated    Previous Hematologic/ Oncologic History:    7/9/15 resection of lesion revealing malignant melanoma, Jimbo'es level IV lesion maximum thickness of 2 35 mm, ulcerated, mitotic rate of 13 mm/m2  1  7/28/15 wide excision of foot and right groin sentinel lymph node biopsy 1/2 lymph nodes were positive  2  8/13/15: right groin lymph node dissection 1/6 right superficial groin nodes were positive  All other nodes were negative  3  10/1/15 Radical resection of malignant soft tissue neoplasm of distal deep femoral region  4  -088 adjuvant clinical trial with Ipilimumab vs  Nivolumab, first treatment was 11/2/15, randomization was broken in January 2016, she was receiving Ipilimumab  5  2/12/16 adrenal insufficiency and hypophysitis- started hydrocortisone 20 mg in the am, 10 mg in the pm  Pembrolizumab 2 mg/kg every three weeks, first treatment was on 2/10/16, last dose was on 7/18/16  6  STarted Dabrafenib 150 mg every 12 hours PO along with Mekinist 2 mg PO daily on 8/8/16, stopped on 8/22/16, resumed on 9/2/16 at dose reduction: Dabrafenib 75 mg BID along with Mekinist 2 mg PO daily, drug stopped on 9/3/16 secondary to nausea, vomiting, and pyrexia  7  STarted Vemurafenib 960 mg PO every 12 hours along with Cotellic 60 mg PO daily three weeks on and one week off on 9/16/16, stopped on 9/27/16 secondary to nausea, vomiting, grade 4 maculopapular rash, and high fevers  8 October 24, 2016 Started Dabrafenib 75 mg BID, Mekinist 1 mg PO every other day    Current Hematologic/ Oncologic Treatment:    Dabrafenib 150 mg BID plus Mekinist 1 mg PO daily every other day, started this regimen in November 2016          Test Results:    Imaging: No results found      Labs:   Lab Results   Component Value Date    WBC 7 50 08/08/2016    HGB 13 3 08/08/2016    HCT 41 3 08/08/2016    MCV 89 08/08/2016     08/08/2016     Lab Results   Component Value Date     08/08/2016    K 3 8 08/08/2016     08/08/2016    CO2 33 (H) 08/08/2016    ANIONGAP 8 08/08/2016    BUN 5 08/08/2016    CREATININE 0 65 08/08/2016    GLUCOSE 100 08/08/2016    CALCIUM 9 2 08/08/2016    AST 12 08/08/2016    ALT 14 08/08/2016    ALKPHOS 83 08/08/2016    PROT 7 2 08/08/2016    BILITOT 0 50 08/08/2016    EGFR >60 0 08/08/2016         No results found for: SPEP, UPEP    No results found for: PSA    No results found for: CEA    No results found for:     No results found for: AFP    No results found for: IRON, TIBC, FERRITIN    No results found for: [id number]      ROS: Review of Systems   Constitutional: Negative  HENT: Negative  Eyes: Negative  Respiratory: Negative  Cardiovascular: Negative  Gastrointestinal: Negative  Endocrine: Negative  Genitourinary: Negative  Musculoskeletal: Negative  Skin: Negative  Allergic/Immunologic: Negative  Neurological: Negative  Hematological: Negative  Psychiatric/Behavioral: Negative  Current Medications: Reviewed  Allergies: Reviewed  PMH/FH/SH:  Reviewed      Physical Exam:    There is no height or weight on file to calculate BSA  Wt Readings from Last 3 Encounters:   03/02/18 79 2 kg (174 lb 8 oz)   02/02/18 78 5 kg (173 lb)   12/29/17 78 kg (172 lb)        Temp Readings from Last 3 Encounters:   03/02/18 (!) 97 3 °F (36 3 °C)   02/02/18 97 7 °F (36 5 °C)   12/29/17 (!) 96 5 °F (35 8 °C)        BP Readings from Last 3 Encounters:   03/02/18 120/86   02/02/18 132/80   12/29/17 128/68         Pulse Readings from Last 3 Encounters:   03/02/18 67   02/02/18 68   12/29/17 85     @LASTSAO2(3)@      Physical Exam   Constitutional: She is oriented to person, place, and time  She appears well-developed and well-nourished  HENT:   Head: Normocephalic and atraumatic  Nose: Nose normal    Mouth/Throat: Oropharynx is clear and moist    Eyes: Conjunctivae and EOM are normal  Pupils are equal, round, and reactive to light  Neck: Normal range of motion  Neck supple  Cardiovascular: Normal rate, regular rhythm and normal heart sounds  Pulmonary/Chest: Effort normal and breath sounds normal    Abdominal: Soft  Bowel sounds are normal    Musculoskeletal: Normal range of motion  Neurological: She is alert and oriented to person, place, and time  She has normal reflexes  Skin: Skin is warm and dry     Psychiatric: She has a normal mood and affect  Judgment normal    Vitals reviewed  Goals and Barriers:  Current Goal:  Prolong Survival from Cancer  Barriers: None  Patient's Capacity to Self Care:  Patient fully able to self care      Code Status: [unfilled]  Advance Directive and Living Will:      Power of :

## 2018-04-05 ENCOUNTER — OFFICE VISIT (OUTPATIENT)
Dept: HEMATOLOGY ONCOLOGY | Facility: CLINIC | Age: 58
End: 2018-04-05
Payer: COMMERCIAL

## 2018-04-05 VITALS
HEIGHT: 64 IN | WEIGHT: 174 LBS | TEMPERATURE: 97.4 F | HEART RATE: 81 BPM | OXYGEN SATURATION: 97 % | BODY MASS INDEX: 29.71 KG/M2

## 2018-04-05 DIAGNOSIS — C78.00 SECONDARY MALIGNANT MELANOMA OF LUNG, UNSPECIFIED LATERALITY: ICD-10-CM

## 2018-04-05 DIAGNOSIS — C79.9 METASTATIC MELANOMA (HCC): Primary | ICD-10-CM

## 2018-04-05 DIAGNOSIS — C78.7 METASTATIC MELANOMA TO LIVER (HCC): ICD-10-CM

## 2018-04-05 PROCEDURE — 99215 OFFICE O/P EST HI 40 MIN: CPT | Performed by: SPECIALIST

## 2018-05-04 DIAGNOSIS — C79.9 METASTATIC MALIGNANT MELANOMA (HCC): Primary | ICD-10-CM

## 2018-05-04 RX ORDER — DABRAFENIB 75 MG/1
CAPSULE ORAL
Qty: 60 CAPSULE | Refills: 2 | Status: SHIPPED | OUTPATIENT
Start: 2018-05-04 | End: 2018-08-15 | Stop reason: SDUPTHER

## 2018-05-07 NOTE — PROGRESS NOTES
Hematology/Oncology Outpatient Follow- up Note  Kimberly Arguelles 65 y o  female MRN: @ Encounter: [id number]        Date:  5/7/2018        Assessment / Plan:    1  Metastatic Melanoma: currently on Dabrafenib 150 mg BID along with Mekinist 1 mg PO daily every other day  Thus far she has tolerated this regimen well (she had trouble with full dose BRAF/MEK combination- had fevers, arthralgias, etc )  She had repeat imaging on 4/26/18, which continues to show stable disease  Her next set of scans will be at the end of July  Her labs were reviewed and are sufficient for treatment  She will follow up with us in one month with a CBC, CMP, and LDH  2  Adrenal insufficiency: being followed by [place] [facility]: she is currently on Hydrocortisone- 25 mg        Subjective:   CC: metastatic melanoma follow up       HPI:    Mrs Radha Asencio is a 65year-old female who presents to us for possible participation in our adjuvant clinical trial of Ipilimumab vs  Nivolumab  Mrs Radha Asencio initially noticed a mole on her foot in December, however she felt it was a regular mole at that time  By spring the mole had grown and changed in appearance  She also had a few episodes where 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 had 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 causing the lesion to bleed  She presented to her dermatologist who debrided and resected the lesion  This was positive for malignant melanoma and had a Jimbo's level IV lesion with maximum thickness of 2 35 mm, it was ulcerated, and had a mitotic rate of 13 mm/m2  There was no evidence of lymphovascular invasion  On 7/28/15 she had a wide excision with sentinel lymph node biopsy 1/2 lymph nodes were positive  She subsequently had a PET/CT on 8/5/15, which did not reveal any distant metastatic disease, however she did have a hypermetabolic right popliteal fossa lymph node  On 8/13/15 she had a right groin lymph node dissection, 1/6 of the superficial groin nodes was positive for macrometastatic disease   All other lymph nodes removed were negative  The patient has stage IIIC disease  he patient was in the process of being worked up for the [facility] 209-238 clinical trial looking a Ipilimumab vs  Nivolumab in the adjuvant setting as she does have stage IIIC disease  A PET/CT scan, part of the staging requirements for the clinical trial, showed hypermetabolic activity posterior to the distal right femur  On 10/1/15 She had a resection of the femoral deep nodule and it was consistent with malignant melanoma  The patient was found to have recurrent disease in January 2016, she was unblinded from the [facility] 209-238 clinical trial and was randomized to Ipilimumab  She started on Pembrolizumab on 2/10/16  She was found to have disease progression again and started treatment with Dabrafenib and Mekinist on 8/8/16, she unfortunately had a lot of issues with pyrexia  We switched her to Vemurafenib along with Cotellic on 7/96/65  She developed high fevers, rash, nausea, and vomiting  In October 2016 we switched her back to Dabrafenib 75 mg BID along with Mekinist 1 mg PO every other day In November 2016  Interval History:    Overall the patient is doing well  She has a good energy level with an ECOG performance status of zero  She has a good appetite and is trying to lose weight  She has no new issues  She denies fevers, chills, CP, SOB, N/V, diarrhea, all other ROS are unremarkable  PFSH was reviewed  Cancer Staging:  metastatic melanoma to her lung, liver    BRAF: mutated    Previous Hematologic/ Oncologic History:    7/9/15 resection of lesion revealing malignant melanoma, Jimbo'es level IV lesion maximum thickness of 2 35 mm, ulcerated, mitotic rate of 13 mm/m2  1  7/28/15 wide excision of foot and right groin sentinel lymph node biopsy 1/2 lymph nodes were positive  2  8/13/15: right groin lymph node dissection 1/6 right superficial groin nodes were positive   All other nodes were negative  3  10/1/15 Radical resection of malignant soft tissue neoplasm of distal deep femoral region  4  -238 adjuvant clinical trial with Ipilimumab vs  Nivolumab, first treatment was 11/2/15, randomization was broken in January 2016, she was receiving Ipilimumab  5  2/12/16 adrenal insufficiency and hypophysitis- started hydrocortisone 20 mg in the am, 10 mg in the pm  Pembrolizumab 2 mg/kg every three weeks, first treatment was on 2/10/16, last dose was on 7/18/16  6  STarted Dabrafenib 150 mg every 12 hours PO along with Mekinist 2 mg PO daily on 8/8/16, stopped on 8/22/16, resumed on 9/2/16 at dose reduction: Dabrafenib 75 mg BID along with Mekinist 2 mg PO daily, drug stopped on 9/3/16 secondary to nausea, vomiting, and pyrexia  7  STarted Vemurafenib 960 mg PO every 12 hours along with Cotellic 60 mg PO daily three weeks on and one week off on 9/16/16, stopped on 9/27/16 secondary to nausea, vomiting, grade 4 maculopapular rash, and high fevers  8  October 24, 2016 Started Dabrafenib 75 mg BID, Mekinist 1 mg PO every other day    Current Hematologic/ Oncologic Treatment:    Dabrafenib 150 mg BID plus Mekinist 1 mg PO daily every other day, started this regimen in November 2016          Test Results:    Imaging: No results found      Labs:   Lab Results   Component Value Date    WBC 7 50 08/08/2016    HGB 13 3 08/08/2016    HCT 41 3 08/08/2016    MCV 89 08/08/2016     08/08/2016     Lab Results   Component Value Date     08/08/2016    K 3 8 08/08/2016     08/08/2016    CO2 33 (H) 08/08/2016    ANIONGAP 8 08/08/2016    BUN 5 08/08/2016    CREATININE 0 65 08/08/2016    GLUCOSE 100 08/08/2016    CALCIUM 9 2 08/08/2016    AST 12 08/08/2016    ALT 14 08/08/2016    ALKPHOS 83 08/08/2016    PROT 7 2 08/08/2016    BILITOT 0 50 08/08/2016    EGFR >60 0 08/08/2016         No results found for: SPEP, UPEP    No results found for: PSA    No results found for: CEA    No results found for:     No results found for: AFP    No results found for: IRON, TIBC, FERRITIN    No results found for: [id number]      ROS: Review of Systems   Constitutional: Negative  HENT: Negative  Eyes: Negative  Respiratory: Negative  Cardiovascular: Negative  Gastrointestinal: Negative  Endocrine: Negative  Genitourinary: Negative  Musculoskeletal: Negative  Skin: Negative  Allergic/Immunologic: Negative  Neurological: Negative  Hematological: Negative  Psychiatric/Behavioral: Negative  Current Medications: Reviewed  Allergies: Reviewed  PMH/FH/SH:  Reviewed      Physical Exam:    There is no height or weight on file to calculate BSA  Wt Readings from Last 3 Encounters:   04/05/18 78 9 kg (174 lb)   03/02/18 79 2 kg (174 lb 8 oz)   02/02/18 78 5 kg (173 lb)        Temp Readings from Last 3 Encounters:   04/05/18 (!) 97 4 °F (36 3 °C) (Tympanic)   03/02/18 (!) 97 3 °F (36 3 °C)   02/02/18 97 7 °F (36 5 °C)        BP Readings from Last 3 Encounters:   03/02/18 120/86   02/02/18 132/80   12/29/17 128/68         Pulse Readings from Last 3 Encounters:   04/05/18 81   03/02/18 67   02/02/18 68     @LASTSAO2(3)@      Physical Exam   Constitutional: She is oriented to person, place, and time  She appears well-developed and well-nourished  HENT:   Head: Normocephalic and atraumatic  Mouth/Throat: Oropharynx is clear and moist    Eyes: Conjunctivae and EOM are normal  Pupils are equal, round, and reactive to light  Neck: Normal range of motion  Neck supple  No thyromegaly present  Cardiovascular: Normal rate, regular rhythm and normal heart sounds  Pulmonary/Chest: Effort normal and breath sounds normal    Abdominal: Soft  Bowel sounds are normal  She exhibits no distension and no mass  There is no tenderness  Musculoskeletal: Normal range of motion  She exhibits no edema  Lymphadenopathy:     She has no cervical adenopathy  Neurological: She is alert and oriented to person, place, and time  She has normal reflexes     Skin: Skin is warm and dry  No erythema  Psychiatric: She has a normal mood and affect  Vitals reviewed  Goals and Barriers:  Current Goal: Prolong Survival from Cancer  Barriers: None  Patient's Capacity to Self Care:  Patient fully able to self care      Code Status: [unfilled]  Advance Directive and Living Will:      Power of :

## 2018-05-09 ENCOUNTER — OFFICE VISIT (OUTPATIENT)
Dept: HEMATOLOGY ONCOLOGY | Facility: CLINIC | Age: 58
End: 2018-05-09
Payer: COMMERCIAL

## 2018-05-09 VITALS
HEART RATE: 79 BPM | OXYGEN SATURATION: 97 % | HEIGHT: 64 IN | BODY MASS INDEX: 29.53 KG/M2 | SYSTOLIC BLOOD PRESSURE: 122 MMHG | WEIGHT: 173 LBS | DIASTOLIC BLOOD PRESSURE: 80 MMHG | RESPIRATION RATE: 16 BRPM | TEMPERATURE: 98.1 F

## 2018-05-09 DIAGNOSIS — C78.00 METASTATIC MELANOMA TO LUNG, UNSPECIFIED LATERALITY (HCC): ICD-10-CM

## 2018-05-09 DIAGNOSIS — C79.9 METASTATIC MELANOMA (HCC): Primary | ICD-10-CM

## 2018-05-09 PROCEDURE — 99214 OFFICE O/P EST MOD 30 MIN: CPT | Performed by: SPECIALIST

## 2018-06-06 NOTE — PROGRESS NOTES
Hematology/Oncology Outpatient Follow- up Note  Chandan Galvan 63 y o  female MRN: @ Encounter: [id number]        Date:  6/6/2018        Assessment / Plan:    1  Metastatic Melanoma: currently on Dabrafenib 150 mg BID along with Mekinist 1 mg PO daily every other day  Thus far she has tolerated this regimen well (she had trouble with full dose BRAF/MEK combination- had fevers, arthralgias, etc )  She had repeat imaging on 4/26/18, which continues to show stable disease  Her next set of scans will be at the end of July, which have been ordered  Her labs were reviewed and are sufficient for treatment  She will follow up with us in one month with a CBC, CMP, and LDH  2  Adrenal insufficiency: being followed by [place] [facility]: she is currently on Hydrocortisone- 25 mg        Subjective:   CC: follow up regarding metastatic melanoma      HPI:    Mrs Jannifer Dancer is a 65year-old female who presents to us for possible participation in our adjuvant clinical trial of Ipilimumab vs  Nivolumab  Mrs Jannifer Dancer initially noticed a mole on her foot in December, however she felt it was a regular mole at that time  By spring the mole had grown and changed in appearance  She also had a few episodes where 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 had 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 causing the lesion to bleed  She presented to her dermatologist who debrided and resected the lesion  This was positive for malignant melanoma and had a Jimbo's level IV lesion with maximum thickness of 2 35 mm, it was ulcerated, and had a mitotic rate of 13 mm/m2  There was no evidence of lymphovascular invasion  On 7/28/15 she had a wide excision with sentinel lymph node biopsy 1/2 lymph nodes were positive  She subsequently had a PET/CT on 8/5/15, which did not reveal any distant metastatic disease, however she did have a hypermetabolic right popliteal fossa lymph node   On 8/13/15 she had a right groin lymph node dissection, 1/6 of the superficial groin nodes was positive for macrometastatic disease  All other lymph nodes removed were negative  The patient has stage IIIC disease  he patient was in the process of being worked up for the [facility] 209-238 clinical trial looking a Ipilimumab vs  Nivolumab in the adjuvant setting as she does have stage IIIC disease  A PET/CT scan, part of the staging requirements for the clinical trial, showed hypermetabolic activity posterior to the distal right femur  On 10/1/15 She had a resection of the femoral deep nodule and it was consistent with malignant melanoma  The patient was found to have recurrent disease in January 2016, she was unblinded from the [facility] 209-238 clinical trial and was randomized to Ipilimumab  She started on Pembrolizumab on 2/10/16  She was found to have disease progression again and started treatment with Dabrafenib and Mekinist on 8/8/16, she unfortunately had a lot of issues with pyrexia  We switched her to Vemurafenib along with Cotellic on 5/16/42  She developed high fevers, rash, nausea, and vomiting  In October 2016 we switched her back to Dabrafenib 75 mg BID along with Mekinist 1 mg PO every other day In November 2016  Interval History:    Overall the patient is doing well  She has a good energy level with an ECOG performance status of zero  She has a good appetite and her weight has been stable  She has had loose stools, at most 2 a day  She also has some new right leg swelling secondary to the heat  If she elevates her leg it improves  She otherwise denies fevers, chills,CP, SOB, N/V, diarrhea, all other ROs are unremarkable  PFSH was reviewed  Cancer Staging:  metastatic melanoma to her lung, liver    BRAF: mutated    Previous Hematologic/ Oncologic History:    7/9/15 resection of lesion revealing malignant melanoma, Jimbo'es level IV lesion maximum thickness of 2 35 mm, ulcerated, mitotic rate of 13 mm/m2  1  7/28/15 wide excision of foot and right groin sentinel lymph node biopsy 1/2 lymph nodes were positive  2  8/13/15: right groin lymph node dissection 1/6 right superficial groin nodes were positive  All other nodes were negative  3  10/1/15 Radical resection of malignant soft tissue neoplasm of distal deep femoral region  4  -[id number] adjuvant clinical trial with Ipilimumab vs  Nivolumab, first treatment was 11/2/15, randomization was broken in January 2016, she was receiving Ipilimumab  5  2/12/16 adrenal insufficiency and hypophysitis- started hydrocortisone 20 mg in the am, 10 mg in the pm  Pembrolizumab 2 mg/kg every three weeks, first treatment was on 2/10/16, last dose was on 7/18/16  6  STarted Dabrafenib 150 mg every 12 hours PO along with Mekinist 2 mg PO daily on 8/8/16, stopped on 8/22/16, resumed on 9/2/16 at dose reduction: Dabrafenib 75 mg BID along with Mekinist 2 mg PO daily, drug stopped on 9/3/16 secondary to nausea, vomiting, and pyrexia  7  STarted Vemurafenib 960 mg PO every 12 hours along with Cotellic 60 mg PO daily three weeks on and one week off on 9/16/16, stopped on 9/27/16 secondary to nausea, vomiting, grade 4 maculopapular rash, and high fevers  8 October 24, 2016 Started Dabrafenib 75 mg BID, Mekinist 1 mg PO every other day    Current Hematologic/ Oncologic Treatment:    Dabrafenib 150 mg BID plus Mekinist 1 mg PO daily every other day, started this regimen in November 2016          Test Results:    Imaging: No results found      Labs:   Lab Results   Component Value Date    WBC 7 50 08/08/2016    HGB 13 3 08/08/2016    HCT 41 3 08/08/2016    MCV 89 08/08/2016     08/08/2016     Lab Results   Component Value Date     08/08/2016    K 3 8 08/08/2016     08/08/2016    CO2 33 (H) 08/08/2016    ANIONGAP 8 08/08/2016    BUN 5 08/08/2016    CREATININE 0 65 08/08/2016    GLUCOSE 100 08/08/2016    CALCIUM 9 2 08/08/2016    AST 12 08/08/2016    ALT 14 08/08/2016    ALKPHOS 83 08/08/2016    PROT 7 2 08/08/2016    BILITOT 0 50 08/08/2016    EGFR >60 0 08/08/2016         No results found for: SPEP, UPEP    No results found for: PSA    No results found for: CEA    No results found for:     No results found for: AFP    No results found for: IRON, TIBC, FERRITIN    No results found for: [id number]      ROS: Review of Systems   Constitutional: Negative  HENT: Negative  Eyes: Negative  Respiratory: Negative  Cardiovascular: Positive for leg swelling (right leg)  Gastrointestinal: Positive for diarrhea  Endocrine: Negative  Genitourinary: Negative  Musculoskeletal: Negative  Skin: Negative  Allergic/Immunologic: Negative  Neurological: Negative  Hematological: Negative  Psychiatric/Behavioral: Negative  Current Medications: Reviewed  Allergies: Reviewed  PMH/FH/SH:  Reviewed      Physical Exam:    There is no height or weight on file to calculate BSA  Wt Readings from Last 3 Encounters:   05/09/18 78 5 kg (173 lb)   04/05/18 78 9 kg (174 lb)   03/02/18 79 2 kg (174 lb 8 oz)        Temp Readings from Last 3 Encounters:   05/09/18 98 1 °F (36 7 °C) (Tympanic)   04/05/18 (!) 97 4 °F (36 3 °C) (Tympanic)   03/02/18 (!) 97 3 °F (36 3 °C)        BP Readings from Last 3 Encounters:   05/09/18 122/80   03/02/18 120/86   02/02/18 132/80         Pulse Readings from Last 3 Encounters:   05/09/18 79   04/05/18 81   03/02/18 67     @LASTSAO2(3)@      Physical Exam   Constitutional: She is oriented to person, place, and time  She appears well-developed and well-nourished  HENT:   Head: Normocephalic and atraumatic  Nose: Nose normal    Mouth/Throat: Oropharynx is clear and moist    Eyes: Conjunctivae and EOM are normal  Pupils are equal, round, and reactive to light  Neck: Normal range of motion  Neck supple  Cardiovascular: Normal rate, regular rhythm and normal heart sounds  Pulmonary/Chest: Effort normal and breath sounds normal    Abdominal: Soft  Bowel sounds are normal    Musculoskeletal: Normal range of motion     Neurological: She is alert and oriented to person, place, and time  She has normal reflexes  Skin: Skin is warm and dry  Psychiatric: She has a normal mood and affect  Judgment normal    Vitals reviewed  Goals and Barriers:  Current Goal:  Prolong Survival from Cancer  Barriers: None  Patient's Capacity to Self Care:  Patient fully able to self care      Code Status: [unfilled]  Advance Directive and Living Will:      Power of :

## 2018-06-08 ENCOUNTER — OFFICE VISIT (OUTPATIENT)
Dept: HEMATOLOGY ONCOLOGY | Facility: CLINIC | Age: 58
End: 2018-06-08
Payer: COMMERCIAL

## 2018-06-08 VITALS
HEART RATE: 64 BPM | WEIGHT: 174 LBS | BODY MASS INDEX: 29.71 KG/M2 | DIASTOLIC BLOOD PRESSURE: 82 MMHG | SYSTOLIC BLOOD PRESSURE: 126 MMHG | OXYGEN SATURATION: 99 % | HEIGHT: 64 IN | RESPIRATION RATE: 18 BRPM | TEMPERATURE: 97.6 F

## 2018-06-08 DIAGNOSIS — C78.00 METASTATIC MELANOMA TO LUNG, UNSPECIFIED LATERALITY (HCC): ICD-10-CM

## 2018-06-08 DIAGNOSIS — C79.51 MALIGNANT MELANOMA METASTATIC TO BONE (HCC): ICD-10-CM

## 2018-06-08 DIAGNOSIS — C79.9 METASTATIC MELANOMA (HCC): Primary | ICD-10-CM

## 2018-06-08 PROCEDURE — 99214 OFFICE O/P EST MOD 30 MIN: CPT | Performed by: SPECIALIST

## 2018-06-08 NOTE — LETTER
June 8, 2018     Fina Broussard MD  [id number] [place] [id number]    Patient: Eufemia Holley   YOB: 1960   Date of Visit: 6/8/2018       Dear Dr Pastor Celaya: Thank you for referring Katerine Hansen to me for evaluation  Below are my notes for this consultation  If you have questions, please do not hesitate to call me  I look forward to following your patient along with you  Sincerely,        Fina Broussard MD        CC: No Recipients  Anitra Garcia PA-C  6/8/2018 11:19 AM  Sign at close encounter  Hematology/Oncology Outpatient Follow- up Note  Eufemia Holley 63 y o  female MRN: @ Encounter: [id number]        Date:  6/6/2018        Assessment / Plan:    1  Metastatic Melanoma: currently on Dabrafenib 150 mg BID along with Mekinist 1 mg PO daily every other day  Thus far she has tolerated this regimen well (she had trouble with full dose BRAF/MEK combination- had fevers, arthralgias, etc )  She had repeat imaging on 4/26/18, which continues to show stable disease  Her next set of scans will be at the end of July, which have been ordered  Her labs were reviewed and are sufficient for treatment  She will follow up with us in one month with a CBC, CMP, and LDH  2  Adrenal insufficiency: being followed by [place] [facility]: she is currently on Hydrocortisone- 25 mg        Subjective:   CC: follow up regarding metastatic melanoma      HPI:    Mrs Miguel Ángel Woo is a 65year-old female who presents to us for possible participation in our adjuvant clinical trial of Ipilimumab vs  Nivolumab  Mrs Miguel Ángel Woo initially noticed a mole on her foot in December, however she felt it was a regular mole at that time  By spring the mole had grown and changed in appearance  She also had a few episodes where 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 had 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 causing the lesion to bleed  She presented to her dermatologist who debrided and resected the lesion   This was positive for malignant melanoma and had a Jimbo's level IV lesion with maximum thickness of 2 35 mm, it was ulcerated, and had a mitotic rate of 13 mm/m2  There was no evidence of lymphovascular invasion  On 7/28/15 she had a wide excision with sentinel lymph node biopsy 1/2 lymph nodes were positive  She subsequently had a PET/CT on 8/5/15, which did not reveal any distant metastatic disease, however she did have a hypermetabolic right popliteal fossa lymph node  On 8/13/15 she had a right groin lymph node dissection, 1/6 of the superficial groin nodes was positive for macrometastatic disease  All other lymph nodes removed were negative  The patient has stage IIIC disease  he patient was in the process of being worked up for the [facility] 209-238 clinical trial looking a Ipilimumab vs  Nivolumab in the adjuvant setting as she does have stage IIIC disease  A PET/CT scan, part of the staging requirements for the clinical trial, showed hypermetabolic activity posterior to the distal right femur  On 10/1/15 She had a resection of the femoral deep nodule and it was consistent with malignant melanoma  The patient was found to have recurrent disease in January 2016, she was unblinded from the [facility] 209-238 clinical trial and was randomized to Ipilimumab  She started on Pembrolizumab on 2/10/16  She was found to have disease progression again and started treatment with Dabrafenib and Mekinist on 8/8/16, she unfortunately had a lot of issues with pyrexia  We switched her to Vemurafenib along with Cotellic on 7/72/75  She developed high fevers, rash, nausea, and vomiting  In October 2016 we switched her back to Dabrafenib 75 mg BID along with Mekinist 1 mg PO every other day In November 2016  Interval History:    Overall the patient is doing well  She has a good energy level with an ECOG performance status of zero  She has a good appetite and her weight has been stable  She has had loose stools, at most 2 a day   She also has some new right leg swelling secondary to the heat  If she elevates her leg it improves  She otherwise denies fevers, chills,CP, SOB, N/V, diarrhea, all other ROs are unremarkable  PFSH was reviewed  Cancer Staging:  metastatic melanoma to her lung, liver    BRAF: mutated    Previous Hematologic/ Oncologic History:    7/9/15 resection of lesion revealing malignant melanoma, Jimbo'es level IV lesion maximum thickness of 2 35 mm, ulcerated, mitotic rate of 13 mm/m2  1  7/28/15 wide excision of foot and right groin sentinel lymph node biopsy 1/2 lymph nodes were positive  2  8/13/15: right groin lymph node dissection 1/6 right superficial groin nodes were positive  All other nodes were negative  3  10/1/15 Radical resection of malignant soft tissue neoplasm of distal deep femoral region  4  [facility] 209-238 adjuvant clinical trial with Ipilimumab vs  Nivolumab, first treatment was 11/2/15, randomization was broken in January 2016, she was receiving Ipilimumab  5  2/12/16 adrenal insufficiency and hypophysitis- started hydrocortisone 20 mg in the am, 10 mg in the pm  Pembrolizumab 2 mg/kg every three weeks, first treatment was on 2/10/16, last dose was on 7/18/16  6  STarted Dabrafenib 150 mg every 12 hours PO along with Mekinist 2 mg PO daily on 8/8/16, stopped on 8/22/16, resumed on 9/2/16 at dose reduction: Dabrafenib 75 mg BID along with Mekinist 2 mg PO daily, drug stopped on 9/3/16 secondary to nausea, vomiting, and pyrexia  7  STarted Vemurafenib 960 mg PO every 12 hours along with Cotellic 60 mg PO daily three weeks on and one week off on 9/16/16, stopped on 9/27/16 secondary to nausea, vomiting, grade 4 maculopapular rash, and high fevers  8 October 24, 2016 Started Dabrafenib 75 mg BID, Mekinist 1 mg PO every other day    Current Hematologic/ Oncologic Treatment:    Dabrafenib 150 mg BID plus Mekinist 1 mg PO daily every other day, started this regimen in November 2016          Test Results:    Imaging: No results found      Labs:   Lab Results Component Value Date    WBC 7 50 08/08/2016    HGB 13 3 08/08/2016    HCT 41 3 08/08/2016    MCV 89 08/08/2016     08/08/2016     Lab Results   Component Value Date     08/08/2016    K 3 8 08/08/2016     08/08/2016    CO2 33 (H) 08/08/2016    ANIONGAP 8 08/08/2016    BUN 5 08/08/2016    CREATININE 0 65 08/08/2016    GLUCOSE 100 08/08/2016    CALCIUM 9 2 08/08/2016    AST 12 08/08/2016    ALT 14 08/08/2016    ALKPHOS 83 08/08/2016    PROT 7 2 08/08/2016    BILITOT 0 50 08/08/2016    EGFR >60 0 08/08/2016         No results found for: SPEP, UPEP    No results found for: PSA    No results found for: CEA    No results found for:     No results found for: AFP    No results found for: IRON, TIBC, FERRITIN    No results found for: [id number]      ROS: Review of Systems   Constitutional: Negative  HENT: Negative  Eyes: Negative  Respiratory: Negative  Cardiovascular: Positive for leg swelling (right leg)  Gastrointestinal: Positive for diarrhea  Endocrine: Negative  Genitourinary: Negative  Musculoskeletal: Negative  Skin: Negative  Allergic/Immunologic: Negative  Neurological: Negative  Hematological: Negative  Psychiatric/Behavioral: Negative  Current Medications: Reviewed  Allergies: Reviewed  PMH/FH/SH:  Reviewed      Physical Exam:    There is no height or weight on file to calculate BSA      Wt Readings from Last 3 Encounters:   05/09/18 78 5 kg (173 lb)   04/05/18 78 9 kg (174 lb)   03/02/18 79 2 kg (174 lb 8 oz)        Temp Readings from Last 3 Encounters:   05/09/18 98 1 °F (36 7 °C) (Tympanic)   04/05/18 (!) 97 4 °F (36 3 °C) (Tympanic)   03/02/18 (!) 97 3 °F (36 3 °C)        BP Readings from Last 3 Encounters:   05/09/18 122/80   03/02/18 120/86   02/02/18 132/80         Pulse Readings from Last 3 Encounters:   05/09/18 79   04/05/18 81   03/02/18 67     @LASTSAO2(3)@      Physical Exam   Constitutional: She is oriented to person, place, and time  She appears well-developed and well-nourished  HENT:   Head: Normocephalic and atraumatic  Nose: Nose normal    Mouth/Throat: Oropharynx is clear and moist    Eyes: Conjunctivae and EOM are normal  Pupils are equal, round, and reactive to light  Neck: Normal range of motion  Neck supple  Cardiovascular: Normal rate, regular rhythm and normal heart sounds  Pulmonary/Chest: Effort normal and breath sounds normal    Abdominal: Soft  Bowel sounds are normal    Musculoskeletal: Normal range of motion  Neurological: She is alert and oriented to person, place, and time  She has normal reflexes  Skin: Skin is warm and dry  Psychiatric: She has a normal mood and affect  Judgment normal    Vitals reviewed  Goals and Barriers:  Current Goal:  Prolong Survival from Cancer  Barriers: None  Patient's Capacity to Self Care:  Patient fully able to self care      Code Status: [unfilled]  Advance Directive and Living Will:      Power of :

## 2018-07-05 NOTE — PROGRESS NOTES
Hematology/Oncology Outpatient Follow- up Note  Joan Schneider 63 y o  female MRN: @ Encounter: [id number]        Date:  7/5/2018        Assessment / Plan:    1  Metastatic Melanoma: currently on Dabrafenib 150 mg BID along with Mekinist 1 mg PO daily every other day  Thus far she has tolerated this regimen well (she had trouble with full dose BRAF/MEK combination- had fevers, arthralgias, etc )  She had repeat imaging on 4/26/18, which continues to show stable disease  Her next set of scans will be at the end of July, which have been ordered  Her labs were reviewed and are sufficient for treatment  She will follow up with us in one month with a CBC, CMP, and LDH  2  Adrenal insufficiency: being followed by [place] [facility]: she is currently on Hydrocortisone- 25 mg           Subjective:   CC: follow up regarding metastatic melanoma      HPI:    Mrs Ulises Grier is a 63year-old female who presents to us for possible participation in our adjuvant clinical trial of Ipilimumab vs  Nivolumab  Mrs Ulises Grier initially noticed a mole on her foot in December, however she felt it was a regular mole at that time  By spring the mole had grown and changed in appearance  She also had a few episodes where 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 had 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 causing the lesion to bleed  She presented to her dermatologist who debrided and resected the lesion  This was positive for malignant melanoma and had a Jimbo's level IV lesion with maximum thickness of 2 35 mm, it was ulcerated, and had a mitotic rate of 13 mm/m2  There was no evidence of lymphovascular invasion  On 7/28/15 she had a wide excision with sentinel lymph node biopsy 1/2 lymph nodes were positive  She subsequently had a PET/CT on 8/5/15, which did not reveal any distant metastatic disease, however she did have a hypermetabolic right popliteal fossa lymph node   On 8/13/15 she had a right groin lymph node dissection, 1/6 of the superficial groin nodes was positive for macrometastatic disease  All other lymph nodes removed were negative  The patient has stage IIIC disease  he patient was in the process of being worked up for the [facility] 209-238 clinical trial looking a Ipilimumab vs  Nivolumab in the adjuvant setting as she does have stage IIIC disease  A PET/CT scan, part of the staging requirements for the clinical trial, showed hypermetabolic activity posterior to the distal right femur  On 10/1/15 She had a resection of the femoral deep nodule and it was consistent with malignant melanoma  The patient was found to have recurrent disease in January 2016, she was unblinded from the [facility] 209-238 clinical trial and was randomized to Ipilimumab  She started on Pembrolizumab on 2/10/16  She was found to have disease progression again and started treatment with Dabrafenib and Mekinist on 8/8/16, she unfortunately had a lot of issues with pyrexia  We switched her to Vemurafenib along with Cotellic on 4/97/62  She developed high fevers, rash, nausea, and vomiting  In October 2016 we switched her back to Dabrafenib 75 mg BID along with Mekinist 1 mg PO every other day In November 2016  Interval History:    Overall the patient is doing well  She has a good energy level with an ECOG performance status of zero  She has a good appetite and her weight has been stable  She has had more right leg swelling secondary to the humidity, she is wearing a support stocking, which helps  She is also going to physical therapy  She denies fevers, chills, CP, SOB, N/V, diarrhea, all other rOS are unremarkable  PFSH was reviewed  Cancer Staging:  metastatic melanoma to her lung, liver    BRAF: mutated    Previous Hematologic/ Oncologic History:    7/9/15 resection of lesion revealing malignant melanoma, Jimbo'es level IV lesion maximum thickness of 2 35 mm, ulcerated, mitotic rate of 13 mm/m2  1  7/28/15 wide excision of foot and right groin sentinel lymph node biopsy 1/2 lymph nodes were positive  2  8/13/15: right groin lymph node dissection 1/6 right superficial groin nodes were positive  All other nodes were negative  3  10/1/15 Radical resection of malignant soft tissue neoplasm of distal deep femoral region  4  -978 adjuvant clinical trial with Ipilimumab vs  Nivolumab, first treatment was 11/2/15, randomization was broken in January 2016, she was receiving Ipilimumab  5  2/12/16 adrenal insufficiency and hypophysitis- started hydrocortisone 20 mg in the am, 10 mg in the pm  Pembrolizumab 2 mg/kg every three weeks, first treatment was on 2/10/16, last dose was on 7/18/16  6  STarted Dabrafenib 150 mg every 12 hours PO along with Mekinist 2 mg PO daily on 8/8/16, stopped on 8/22/16, resumed on 9/2/16 at dose reduction: Dabrafenib 75 mg BID along with Mekinist 2 mg PO daily, drug stopped on 9/3/16 secondary to nausea, vomiting, and pyrexia  7  STarted Vemurafenib 960 mg PO every 12 hours along with Cotellic 60 mg PO daily three weeks on and one week off on 9/16/16, stopped on 9/27/16 secondary to nausea, vomiting, grade 4 maculopapular rash, and high fevers  8 October 24, 2016 Started Dabrafenib 75 mg BID, Mekinist 1 mg PO every other day    Current Hematologic/ Oncologic Treatment:    Dabrafenib 150 mg BID plus Mekinist 1 mg PO daily every other day, started this regimen in November 2016          Test Results:    Imaging: No results found      Labs:   Lab Results   Component Value Date    WBC 7 50 08/08/2016    HGB 13 3 08/08/2016    HCT 41 3 08/08/2016    MCV 89 08/08/2016     08/08/2016     Lab Results   Component Value Date     08/08/2016    K 3 8 08/08/2016     08/08/2016    CO2 33 (H) 08/08/2016    ANIONGAP 8 08/08/2016    BUN 5 08/08/2016    CREATININE 0 65 08/08/2016    GLUCOSE 100 08/08/2016    CALCIUM 9 2 08/08/2016    AST 12 08/08/2016    ALT 14 08/08/2016    ALKPHOS 83 08/08/2016    PROT 7 2 08/08/2016    BILITOT 0 50 08/08/2016    EGFR >60 0 08/08/2016         No results found for: SPEP, UPEP    No results found for: PSA    No results found for: CEA    No results found for:     No results found for: AFP    No results found for: IRON, TIBC, FERRITIN    No results found for: [id number]      ROS: Review of Systems   Constitutional: Negative  HENT: Negative  Eyes: Negative  Respiratory: Negative  Cardiovascular: Positive for leg swelling (right leg)  Gastrointestinal: Negative  Endocrine: Negative  Genitourinary: Negative  Musculoskeletal: Negative  Skin: Negative  Allergic/Immunologic: Negative  Neurological: Negative  Hematological: Negative  Psychiatric/Behavioral: Negative  Current Medications: Reviewed  Allergies: Reviewed  PMH/FH/SH:  Reviewed      Physical Exam:    There is no height or weight on file to calculate BSA  Wt Readings from Last 3 Encounters:   06/08/18 78 9 kg (174 lb)   05/09/18 78 5 kg (173 lb)   04/05/18 78 9 kg (174 lb)        Temp Readings from Last 3 Encounters:   06/08/18 97 6 °F (36 4 °C) (Oral)   05/09/18 98 1 °F (36 7 °C) (Tympanic)   04/05/18 (!) 97 4 °F (36 3 °C) (Tympanic)        BP Readings from Last 3 Encounters:   06/08/18 126/82   05/09/18 122/80   03/02/18 120/86         Pulse Readings from Last 3 Encounters:   06/08/18 64   05/09/18 79   04/05/18 81     @LASTSAO2(3)@      Physical Exam   Constitutional: She is oriented to person, place, and time  She appears well-developed and well-nourished  HENT:   Head: Normocephalic and atraumatic  Nose: Nose normal    Mouth/Throat: Oropharynx is clear and moist    Eyes: Conjunctivae and EOM are normal  Pupils are equal, round, and reactive to light  Neck: Normal range of motion  Neck supple  Cardiovascular: Normal rate, regular rhythm and normal heart sounds  Pulmonary/Chest: Effort normal and breath sounds normal    Abdominal: Soft  Bowel sounds are normal    Musculoskeletal: Normal range of motion     Neurological: She is alert and oriented to person, place, and time  She has normal reflexes  Skin: Skin is warm and dry  Psychiatric: She has a normal mood and affect  Judgment normal    Vitals reviewed  Goals and Barriers:  Current Goal: Prolong Survival from Cancer  Barriers: None  Patient's Capacity to Self Care:  Patient fully able to self care      Code Status: [unfilled]  Advance Directive and Living Will:      Power of :

## 2018-07-06 ENCOUNTER — OFFICE VISIT (OUTPATIENT)
Dept: HEMATOLOGY ONCOLOGY | Facility: CLINIC | Age: 58
End: 2018-07-06
Payer: COMMERCIAL

## 2018-07-06 VITALS
HEIGHT: 64 IN | TEMPERATURE: 97.8 F | SYSTOLIC BLOOD PRESSURE: 124 MMHG | HEART RATE: 61 BPM | DIASTOLIC BLOOD PRESSURE: 78 MMHG | RESPIRATION RATE: 17 BRPM | BODY MASS INDEX: 29.88 KG/M2 | OXYGEN SATURATION: 98 % | WEIGHT: 175 LBS

## 2018-07-06 DIAGNOSIS — C43.9 MALIGNANT MELANOMA, UNSPECIFIED SITE (HCC): Primary | ICD-10-CM

## 2018-07-06 DIAGNOSIS — C78.00 METASTATIC MELANOMA TO LUNG, UNSPECIFIED LATERALITY (HCC): ICD-10-CM

## 2018-07-06 DIAGNOSIS — C79.9 METASTATIC MELANOMA (HCC): Primary | ICD-10-CM

## 2018-07-06 PROCEDURE — 99214 OFFICE O/P EST MOD 30 MIN: CPT | Performed by: SPECIALIST

## 2018-07-06 NOTE — LETTER
July 6, 2018     Jaydon Cain MD  [id number]    Patient: Tarri Krabbe   YOB: 1960   Date of Visit: 7/6/2018       Dear Dr Holly Hansen: Thank you for referring Thu Duncan to me for evaluation  Below are my notes for this consultation  If you have questions, please do not hesitate to call me  I look forward to following your patient along with you  Sincerely,        Blair Crawford MD        CC: No Recipients  Anitra Garcia PA-C  7/6/2018 10:50 AM  Sign at close encounter  Hematology/Oncology Outpatient Follow- up Note  Tarri Krabbe 63 y o  female MRN: @ Encounter: [id number]        Date:  7/5/2018        Assessment / Plan:    1  Metastatic Melanoma: currently on Dabrafenib 150 mg BID along with Mekinist 1 mg PO daily every other day  Thus far she has tolerated this regimen well (she had trouble with full dose BRAF/MEK combination- had fevers, arthralgias, etc )  She had repeat imaging on 4/26/18, which continues to show stable disease  Her next set of scans will be at the end of July, which have been ordered  Her labs were reviewed and are sufficient for treatment  She will follow up with us in one month with a CBC, CMP, and LDH  2  Adrenal insufficiency: being followed by [place] [facility]: she is currently on Hydrocortisone- 25 mg           Subjective:   CC: follow up regarding metastatic melanoma      HPI:    Mrs Matthew Prieto is a 63year-old female who presents to us for possible participation in our adjuvant clinical trial of Ipilimumab vs  Nivolumab  Mrs Matthew Prieto initially noticed a mole on her foot in December, however she felt it was a regular mole at that time  By spring the mole had grown and changed in appearance  She also had a few episodes where 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 had 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 causing the lesion to bleed  She presented to her dermatologist who debrided and resected the lesion   This was positive for malignant melanoma and had a Jimbo's level IV lesion with maximum thickness of 2 35 mm, it was ulcerated, and had a mitotic rate of 13 mm/m2  There was no evidence of lymphovascular invasion  On 7/28/15 she had a wide excision with sentinel lymph node biopsy 1/2 lymph nodes were positive  She subsequently had a PET/CT on 8/5/15, which did not reveal any distant metastatic disease, however she did have a hypermetabolic right popliteal fossa lymph node  On 8/13/15 she had a right groin lymph node dissection, 1/6 of the superficial groin nodes was positive for macrometastatic disease  All other lymph nodes removed were negative  The patient has stage IIIC disease  he patient was in the process of being worked up for the [facility] 209-238 clinical trial looking a Ipilimumab vs  Nivolumab in the adjuvant setting as she does have stage IIIC disease  A PET/CT scan, part of the staging requirements for the clinical trial, showed hypermetabolic activity posterior to the distal right femur  On 10/1/15 She had a resection of the femoral deep nodule and it was consistent with malignant melanoma  The patient was found to have recurrent disease in January 2016, she was unblinded from the [facility] 209-238 clinical trial and was randomized to Ipilimumab  She started on Pembrolizumab on 2/10/16  She was found to have disease progression again and started treatment with Dabrafenib and Mekinist on 8/8/16, she unfortunately had a lot of issues with pyrexia  We switched her to Vemurafenib along with Cotellic on 6/27/59  She developed high fevers, rash, nausea, and vomiting  In October 2016 we switched her back to Dabrafenib 75 mg BID along with Mekinist 1 mg PO every other day In November 2016  Interval History:    Overall the patient is doing well  She has a good energy level with an ECOG performance status of zero  She has a good appetite and her weight has been stable   She has had more right leg swelling secondary to the humidity, she is wearing a support stocking, which helps  She is also going to physical therapy  She denies fevers, chills, CP, SOB, N/V, diarrhea, all other rOS are unremarkable  PFSH was reviewed  Cancer Staging:  metastatic melanoma to her lung, liver    BRAF: mutated    Previous Hematologic/ Oncologic History:    7/9/15 resection of lesion revealing malignant melanoma, Jimbo'es level IV lesion maximum thickness of 2 35 mm, ulcerated, mitotic rate of 13 mm/m2  1  7/28/15 wide excision of foot and right groin sentinel lymph node biopsy 1/2 lymph nodes were positive  2  8/13/15: right groin lymph node dissection 1/6 right superficial groin nodes were positive  All other nodes were negative  3  10/1/15 Radical resection of malignant soft tissue neoplasm of distal deep femoral region  4  [facility] 209-238 adjuvant clinical trial with Ipilimumab vs  Nivolumab, first treatment was 11/2/15, randomization was broken in January 2016, she was receiving Ipilimumab  5  2/12/16 adrenal insufficiency and hypophysitis- started hydrocortisone 20 mg in the am, 10 mg in the pm  Pembrolizumab 2 mg/kg every three weeks, first treatment was on 2/10/16, last dose was on 7/18/16  6  STarted Dabrafenib 150 mg every 12 hours PO along with Mekinist 2 mg PO daily on 8/8/16, stopped on 8/22/16, resumed on 9/2/16 at dose reduction: Dabrafenib 75 mg BID along with Mekinist 2 mg PO daily, drug stopped on 9/3/16 secondary to nausea, vomiting, and pyrexia  7  STarted Vemurafenib 960 mg PO every 12 hours along with Cotellic 60 mg PO daily three weeks on and one week off on 9/16/16, stopped on 9/27/16 secondary to nausea, vomiting, grade 4 maculopapular rash, and high fevers  8 October 24, 2016 Started Dabrafenib 75 mg BID, Mekinist 1 mg PO every other day    Current Hematologic/ Oncologic Treatment:    Dabrafenib 150 mg BID plus Mekinist 1 mg PO daily every other day, started this regimen in November 2016          Test Results:    Imaging: No results found      Labs:   Lab Results   Component Value Date    WBC 7 50 08/08/2016    HGB 13 3 08/08/2016    HCT 41 3 08/08/2016    MCV 89 08/08/2016     08/08/2016     Lab Results   Component Value Date     08/08/2016    K 3 8 08/08/2016     08/08/2016    CO2 33 (H) 08/08/2016    ANIONGAP 8 08/08/2016    BUN 5 08/08/2016    CREATININE 0 65 08/08/2016    GLUCOSE 100 08/08/2016    CALCIUM 9 2 08/08/2016    AST 12 08/08/2016    ALT 14 08/08/2016    ALKPHOS 83 08/08/2016    PROT 7 2 08/08/2016    BILITOT 0 50 08/08/2016    EGFR >60 0 08/08/2016         No results found for: SPEP, UPEP    No results found for: PSA    No results found for: CEA    No results found for:     No results found for: AFP    No results found for: IRON, TIBC, FERRITIN    No results found for: [id number]      ROS: Review of Systems   Constitutional: Negative  HENT: Negative  Eyes: Negative  Respiratory: Negative  Cardiovascular: Positive for leg swelling (right leg)  Gastrointestinal: Negative  Endocrine: Negative  Genitourinary: Negative  Musculoskeletal: Negative  Skin: Negative  Allergic/Immunologic: Negative  Neurological: Negative  Hematological: Negative  Psychiatric/Behavioral: Negative  Current Medications: Reviewed  Allergies: Reviewed  PMH/FH/SH:  Reviewed      Physical Exam:    There is no height or weight on file to calculate BSA  Wt Readings from Last 3 Encounters:   06/08/18 78 9 kg (174 lb)   05/09/18 78 5 kg (173 lb)   04/05/18 78 9 kg (174 lb)        Temp Readings from Last 3 Encounters:   06/08/18 97 6 °F (36 4 °C) (Oral)   05/09/18 98 1 °F (36 7 °C) (Tympanic)   04/05/18 (!) 97 4 °F (36 3 °C) (Tympanic)        BP Readings from Last 3 Encounters:   06/08/18 126/82   05/09/18 122/80   03/02/18 120/86         Pulse Readings from Last 3 Encounters:   06/08/18 64   05/09/18 79   04/05/18 81     @LASTSAO2(3)@      Physical Exam   Constitutional: She is oriented to person, place, and time   She appears well-developed and well-nourished  HENT:   Head: Normocephalic and atraumatic  Nose: Nose normal    Mouth/Throat: Oropharynx is clear and moist    Eyes: Conjunctivae and EOM are normal  Pupils are equal, round, and reactive to light  Neck: Normal range of motion  Neck supple  Cardiovascular: Normal rate, regular rhythm and normal heart sounds  Pulmonary/Chest: Effort normal and breath sounds normal    Abdominal: Soft  Bowel sounds are normal    Musculoskeletal: Normal range of motion  Neurological: She is alert and oriented to person, place, and time  She has normal reflexes  Skin: Skin is warm and dry  Psychiatric: She has a normal mood and affect  Judgment normal    Vitals reviewed  Goals and Barriers:  Current Goal: Prolong Survival from Cancer  Barriers: None  Patient's Capacity to Self Care:  Patient fully able to self care      Code Status: [unfilled]  Advance Directive and Living Will:      Power of :

## 2018-08-07 PROBLEM — C78.00: Status: ACTIVE | Noted: 2018-08-07

## 2018-08-07 PROBLEM — C79.9 METASTATIC MELANOMA (HCC): Status: ACTIVE | Noted: 2018-08-07

## 2018-08-07 NOTE — PROGRESS NOTES
Hematology/Oncology Outpatient Follow- up Note  Cheyenne Aranda 63 y o  female MRN: @ Encounter: [id number]        Date:  8/7/2018        Assessment / Plan:    1  Metastatic Melanoma: currently on Dabrafenib 150 mg BID along with Mekinist 1 mg PO daily every other day  Thus far she has tolerated this regimen well (she had trouble with full dose BRAF/MEK combination- had fevers, arthralgias, etc )  She had repeat imaging on 7/23/18, which continues to show stable disease, in general she has had partial response to treatment  Her labs were reviewed and are sufficient for treatment  She will follow up with us in one month with a CBC, CMP, and LDH  2  Adrenal insufficiency: being followed by [address] [place] [address] endocrinology: she is currently on Hydrocortisone- 30 mg  3  Hypernatremia: sodium level was 145, I have asked her to hydrate better and start a low sodium diet  Subjective:   CC: follow up regarding metastatic melanoma      HPI:    Mrs Jennifer Clark is a 63year-old female who presents to us for possible participation in our adjuvant clinical trial of Ipilimumab vs  Nivolumab  Mrs Jennifer Clark initially noticed a mole on her foot in December, however she felt it was a regular mole at that time  By spring the mole had grown and changed in appearance  She also had a few episodes where 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 had 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 causing the lesion to bleed  She presented to her dermatologist who debrided and resected the lesion  This was positive for malignant melanoma and had a Jimbo's level IV lesion with maximum thickness of 2 35 mm, it was ulcerated, and had a mitotic rate of 13 mm/m2  There was no evidence of lymphovascular invasion  On 7/28/15 she had a wide excision with sentinel lymph node biopsy 1/2 lymph nodes were positive  She subsequently had a PET/CT on 8/5/15, which did not reveal any distant metastatic disease, however she did have a hypermetabolic right popliteal fossa lymph node   On 8/13/15 she had a right groin lymph node dissection, 1/6 of the superficial groin nodes was positive for macrometastatic disease  All other lymph nodes removed were negative  The patient has stage IIIC disease  he patient was in the process of being worked up for the [facility] 209-238 clinical trial looking a Ipilimumab vs  Nivolumab in the adjuvant setting as she does have stage IIIC disease  A PET/CT scan, part of the staging requirements for the clinical trial, showed hypermetabolic activity posterior to the distal right femur  On 10/1/15 She had a resection of the femoral deep nodule and it was consistent with malignant melanoma  The patient was found to have recurrent disease in January 2016, she was unblinded from the [facility] 209-238 clinical trial and was randomized to Ipilimumab  She started on Pembrolizumab on 2/10/16  She was found to have disease progression again and started treatment with Dabrafenib and Mekinist on 8/8/16, she unfortunately had a lot of issues with pyrexia  We switched her to Vemurafenib along with Cotellic on 6/75/80  She developed high fevers, rash, nausea, and vomiting  In October 2016 we switched her back to Dabrafenib 75 mg BID along with Mekinist 1 mg PO every other day In November 2016  Interval History:    Overall the patient is doing well  She has a good energy level with an ECOG performance status of zero  She has a good appetite and her weight has been stable  She did break her left ankle, which is healing  She does have very mild visual changes, she is going to make an appointment with her ophthalmologist  She also notes loose stools, she has 2 loose stools daily  I have recommended she take Metamucil  She denies fevers, chills, CP, SOB, N/V, diarrhea, all other ROS are unremarkable  PFSh was reviewed        Cancer Staging:  metastatic melanoma to her lung, liver    BRAF: mutated    Previous Hematologic/ Oncologic History:    7/9/15 resection of lesion revealing malignant melanoma, Jimbo'es level IV lesion maximum thickness of 2 35 mm, ulcerated, mitotic rate of 13 mm/m2  1  7/28/15 wide excision of foot and right groin sentinel lymph node biopsy 1/2 lymph nodes were positive  2  8/13/15: right groin lymph node dissection 1/6 right superficial groin nodes were positive  All other nodes were negative  3  10/1/15 Radical resection of malignant soft tissue neoplasm of distal deep femoral region  4  -238 adjuvant clinical trial with Ipilimumab vs  Nivolumab, first treatment was 11/2/15, randomization was broken in January 2016, she was receiving Ipilimumab  5  2/12/16 adrenal insufficiency and hypophysitis- started hydrocortisone 20 mg in the am, 10 mg in the pm  Pembrolizumab 2 mg/kg every three weeks, first treatment was on 2/10/16, last dose was on 7/18/16  6  STarted Dabrafenib 150 mg every 12 hours PO along with Mekinist 2 mg PO daily on 8/8/16, stopped on 8/22/16, resumed on 9/2/16 at dose reduction: Dabrafenib 75 mg BID along with Mekinist 2 mg PO daily, drug stopped on 9/3/16 secondary to nausea, vomiting, and pyrexia  7  STarted Vemurafenib 960 mg PO every 12 hours along with Cotellic 60 mg PO daily three weeks on and one week off on 9/16/16, stopped on 9/27/16 secondary to nausea, vomiting, grade 4 maculopapular rash, and high fevers  8 October 24, 2016 Started Dabrafenib 75 mg BID, Mekinist 1 mg PO every other day    Current Hematologic/ Oncologic Treatment:    Dabrafenib 150 mg BID plus Mekinist 1 mg PO daily every other day, started this regimen in November 2016          Test Results:    Imaging: No results found      Labs:   Lab Results   Component Value Date    WBC 7 50 08/08/2016    HGB 13 3 08/08/2016    HCT 41 3 08/08/2016    MCV 89 08/08/2016     08/08/2016     Lab Results   Component Value Date     08/08/2016    K 3 8 08/08/2016     08/08/2016    CO2 33 (H) 08/08/2016    ANIONGAP 8 08/08/2016    BUN 5 08/08/2016    CREATININE 0 65 08/08/2016    GLUCOSE 100 08/08/2016 CALCIUM 9 2 08/08/2016    AST 12 08/08/2016    ALT 14 08/08/2016    ALKPHOS 83 08/08/2016    PROT 7 2 08/08/2016    BILITOT 0 50 08/08/2016    EGFR >60 0 08/08/2016         No results found for: SPEP, UPEP    No results found for: PSA    No results found for: CEA    No results found for:     No results found for: AFP    No results found for: IRON, TIBC, FERRITIN    No results found for: [id number]      ROS: Review of Systems   Constitutional: Negative  HENT: Negative  Eyes: Negative  Respiratory: Negative  Cardiovascular: Negative  Gastrointestinal: Negative  Endocrine: Negative  Genitourinary: Negative  Musculoskeletal: Negative  Broken ankle   Skin: Negative  Allergic/Immunologic: Negative  Neurological: Negative  Hematological: Negative  Psychiatric/Behavioral: Negative  Current Medications: Reviewed  Allergies: Reviewed  PMH/FH/SH:  Reviewed      Physical Exam:    There is no height or weight on file to calculate BSA  Wt Readings from Last 3 Encounters:   07/06/18 79 4 kg (175 lb)   06/08/18 78 9 kg (174 lb)   05/09/18 78 5 kg (173 lb)        Temp Readings from Last 3 Encounters:   07/06/18 97 8 °F (36 6 °C) (Tympanic)   06/08/18 97 6 °F (36 4 °C) (Oral)   05/09/18 98 1 °F (36 7 °C) (Tympanic)        BP Readings from Last 3 Encounters:   07/06/18 124/78   06/08/18 126/82   05/09/18 122/80         Pulse Readings from Last 3 Encounters:   07/06/18 61   06/08/18 64   05/09/18 79     @LASTSAO2(3)@      Physical Exam   Constitutional: She is oriented to person, place, and time  She appears well-developed and well-nourished  HENT:   Head: Normocephalic and atraumatic  Mouth/Throat: Oropharynx is clear and moist    Eyes: Conjunctivae and EOM are normal  Pupils are equal, round, and reactive to light  Neck: Normal range of motion  Neck supple  No thyromegaly present  Cardiovascular: Normal rate, regular rhythm and normal heart sounds  Pulmonary/Chest: Effort normal and breath sounds normal    Abdominal: Soft  Bowel sounds are normal  She exhibits no distension and no mass  There is no tenderness  Musculoskeletal: Normal range of motion  She exhibits no edema  Ankle brace present   Lymphadenopathy:     She has no cervical adenopathy  Neurological: She is alert and oriented to person, place, and time  She has normal reflexes  Skin: Skin is warm and dry  No erythema  Psychiatric: She has a normal mood and affect  Vitals reviewed  Goals and Barriers:  Current Goal: Prolong Survival from Cancer  Barriers: None  Patient's Capacity to Self Care:  Patient fully able to self care      Code Status: [unfilled]  Advance Directive and Living Will:      Power of :

## 2018-08-08 ENCOUNTER — OFFICE VISIT (OUTPATIENT)
Dept: HEMATOLOGY ONCOLOGY | Facility: CLINIC | Age: 58
End: 2018-08-08
Payer: COMMERCIAL

## 2018-08-08 VITALS
TEMPERATURE: 99.4 F | HEART RATE: 85 BPM | DIASTOLIC BLOOD PRESSURE: 80 MMHG | WEIGHT: 173 LBS | BODY MASS INDEX: 29.53 KG/M2 | HEIGHT: 64 IN | SYSTOLIC BLOOD PRESSURE: 120 MMHG | OXYGEN SATURATION: 97 % | RESPIRATION RATE: 16 BRPM

## 2018-08-08 DIAGNOSIS — C79.9 METASTATIC MELANOMA (HCC): Primary | ICD-10-CM

## 2018-08-08 DIAGNOSIS — C78.00 METASTATIC MELANOMA TO LUNG, UNSPECIFIED LATERALITY (HCC): ICD-10-CM

## 2018-08-08 DIAGNOSIS — C78.7 METASTATIC MELANOMA TO LIVER (HCC): ICD-10-CM

## 2018-08-08 DIAGNOSIS — C43.9 MALIGNANT MELANOMA, UNSPECIFIED SITE (HCC): Primary | ICD-10-CM

## 2018-08-08 PROCEDURE — 99214 OFFICE O/P EST MOD 30 MIN: CPT | Performed by: SPECIALIST

## 2018-08-08 NOTE — LETTER
August 8, 2018     Rajwinder Hernandez MD  [id number]    Patient: Jose Lucio   YOB: 1960   Date of Visit: 8/8/2018       Dear Dr Bhaskar Hunter: Thank you for referring Yvette Mabry to me for evaluation  Below are my notes for this consultation  If you have questions, please do not hesitate to call me  I look forward to following your patient along with you  Sincerely,        Remi Casiano MD        CC: No Recipients  Remi Casiano MD  8/8/2018  2:41 PM  Sign at close encounter  Hematology/Oncology Outpatient Follow- up Note  Jose Lucio 63 y o  female MRN: @ Encounter: [id number]        Date:  8/7/2018        Assessment / Plan:    1  Metastatic Melanoma: currently on Dabrafenib 150 mg BID along with Mekinist 1 mg PO daily every other day  Thus far she has tolerated this regimen well (she had trouble with full dose BRAF/MEK combination- had fevers, arthralgias, etc )  She had repeat imaging on 7/23/18, which continues to show stable disease, in general she has had partial response to treatment  Her labs were reviewed and are sufficient for treatment  She will follow up with us in one month with a CBC, CMP, and LDH  2  Adrenal insufficiency: being followed by [address] [place] [address] endocrinology: she is currently on Hydrocortisone- 30 mg  3  Hypernatremia: sodium level was 145, I have asked her to hydrate better and start a low sodium diet  Subjective:   CC: follow up regarding metastatic melanoma      HPI:    Mrs Angelita Mishra is a 63year-old female who presents to us for possible participation in our adjuvant clinical trial of Ipilimumab vs  Nivolumab  Mrs Angelita Mishra initially noticed a mole on her foot in December, however she felt it was a regular mole at that time  By spring the mole had grown and changed in appearance  She also had a few episodes where 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 had 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 causing the lesion to bleed   She presented to her dermatologist who debrided and resected the lesion  This was positive for malignant melanoma and had a Jimbo's level IV lesion with maximum thickness of 2 35 mm, it was ulcerated, and had a mitotic rate of 13 mm/m2  There was no evidence of lymphovascular invasion  On 7/28/15 she had a wide excision with sentinel lymph node biopsy 1/2 lymph nodes were positive  She subsequently had a PET/CT on 8/5/15, which did not reveal any distant metastatic disease, however she did have a hypermetabolic right popliteal fossa lymph node  On 8/13/15 she had a right groin lymph node dissection, 1/6 of the superficial groin nodes was positive for macrometastatic disease  All other lymph nodes removed were negative  The patient has stage IIIC disease  he patient was in the process of being worked up for the [facility] 209-238 clinical trial looking a Ipilimumab vs  Nivolumab in the adjuvant setting as she does have stage IIIC disease  A PET/CT scan, part of the staging requirements for the clinical trial, showed hypermetabolic activity posterior to the distal right femur  On 10/1/15 She had a resection of the femoral deep nodule and it was consistent with malignant melanoma  The patient was found to have recurrent disease in January 2016, she was unblinded from the [facility] 209-238 clinical trial and was randomized to Ipilimumab  She started on Pembrolizumab on 2/10/16  She was found to have disease progression again and started treatment with Dabrafenib and Mekinist on 8/8/16, she unfortunately had a lot of issues with pyrexia  We switched her to Vemurafenib along with Cotellic on 4/37/20  She developed high fevers, rash, nausea, and vomiting  In October 2016 we switched her back to Dabrafenib 75 mg BID along with Mekinist 1 mg PO every other day In November 2016  Interval History:    Overall the patient is doing well  She has a good energy level with an ECOG performance status of zero  She has a good appetite and her weight has been stable   She did break her left ankle, which is healing  She does have very mild visual changes, she is going to make an appointment with her ophthalmologist  She also notes loose stools, she has 2 loose stools daily  I have recommended she take Metamucil  She denies fevers, chills, CP, SOB, N/V, diarrhea, all other ROS are unremarkable  PFSh was reviewed  Cancer Staging:  metastatic melanoma to her lung, liver    BRAF: mutated    Previous Hematologic/ Oncologic History:    7/9/15 resection of lesion revealing malignant melanoma, Jimbo'es level IV lesion maximum thickness of 2 35 mm, ulcerated, mitotic rate of 13 mm/m2  1  7/28/15 wide excision of foot and right groin sentinel lymph node biopsy 1/2 lymph nodes were positive  2  8/13/15: right groin lymph node dissection 1/6 right superficial groin nodes were positive  All other nodes were negative  3  10/1/15 Radical resection of malignant soft tissue neoplasm of distal deep femoral region  4  [facility] 209-238 adjuvant clinical trial with Ipilimumab vs  Nivolumab, first treatment was 11/2/15, randomization was broken in January 2016, she was receiving Ipilimumab  5  2/12/16 adrenal insufficiency and hypophysitis- started hydrocortisone 20 mg in the am, 10 mg in the pm  Pembrolizumab 2 mg/kg every three weeks, first treatment was on 2/10/16, last dose was on 7/18/16  6  STarted Dabrafenib 150 mg every 12 hours PO along with Mekinist 2 mg PO daily on 8/8/16, stopped on 8/22/16, resumed on 9/2/16 at dose reduction: Dabrafenib 75 mg BID along with Mekinist 2 mg PO daily, drug stopped on 9/3/16 secondary to nausea, vomiting, and pyrexia  7  STarted Vemurafenib 960 mg PO every 12 hours along with Cotellic 60 mg PO daily three weeks on and one week off on 9/16/16, stopped on 9/27/16 secondary to nausea, vomiting, grade 4 maculopapular rash, and high fevers  8 October 24, 2016 Started Dabrafenib 75 mg BID, Mekinist 1 mg PO every other day    Current Hematologic/ Oncologic Treatment:    Dabrafenib 150 mg BID plus Mekinist 1 mg PO daily every other day, started this regimen in November 2016          Test Results:    Imaging: No results found  Labs:   Lab Results   Component Value Date    WBC 7 50 08/08/2016    HGB 13 3 08/08/2016    HCT 41 3 08/08/2016    MCV 89 08/08/2016     08/08/2016     Lab Results   Component Value Date     08/08/2016    K 3 8 08/08/2016     08/08/2016    CO2 33 (H) 08/08/2016    ANIONGAP 8 08/08/2016    BUN 5 08/08/2016    CREATININE 0 65 08/08/2016    GLUCOSE 100 08/08/2016    CALCIUM 9 2 08/08/2016    AST 12 08/08/2016    ALT 14 08/08/2016    ALKPHOS 83 08/08/2016    PROT 7 2 08/08/2016    BILITOT 0 50 08/08/2016    EGFR >60 0 08/08/2016         No results found for: SPEP, UPEP    No results found for: PSA    No results found for: CEA    No results found for:     No results found for: AFP    No results found for: IRON, TIBC, FERRITIN    No results found for: [id number]      ROS: Review of Systems   Constitutional: Negative  HENT: Negative  Eyes: Negative  Respiratory: Negative  Cardiovascular: Negative  Gastrointestinal: Negative  Endocrine: Negative  Genitourinary: Negative  Musculoskeletal: Negative  Broken ankle   Skin: Negative  Allergic/Immunologic: Negative  Neurological: Negative  Hematological: Negative  Psychiatric/Behavioral: Negative  Current Medications: Reviewed  Allergies: Reviewed  PMH/FH/SH:  Reviewed      Physical Exam:    There is no height or weight on file to calculate BSA      Wt Readings from Last 3 Encounters:   07/06/18 79 4 kg (175 lb)   06/08/18 78 9 kg (174 lb)   05/09/18 78 5 kg (173 lb)        Temp Readings from Last 3 Encounters:   07/06/18 97 8 °F (36 6 °C) (Tympanic)   06/08/18 97 6 °F (36 4 °C) (Oral)   05/09/18 98 1 °F (36 7 °C) (Tympanic)        BP Readings from Last 3 Encounters:   07/06/18 124/78   06/08/18 126/82   05/09/18 122/80         Pulse Readings from Last 3 Encounters:   07/06/18 61   06/08/18 64   05/09/18 79     @LASTSAO2(3)@      Physical Exam   Constitutional: She is oriented to person, place, and time  She appears well-developed and well-nourished  HENT:   Head: Normocephalic and atraumatic  Mouth/Throat: Oropharynx is clear and moist    Eyes: Conjunctivae and EOM are normal  Pupils are equal, round, and reactive to light  Neck: Normal range of motion  Neck supple  No thyromegaly present  Cardiovascular: Normal rate, regular rhythm and normal heart sounds  Pulmonary/Chest: Effort normal and breath sounds normal    Abdominal: Soft  Bowel sounds are normal  She exhibits no distension and no mass  There is no tenderness  Musculoskeletal: Normal range of motion  She exhibits no edema  Ankle brace present   Lymphadenopathy:     She has no cervical adenopathy  Neurological: She is alert and oriented to person, place, and time  She has normal reflexes  Skin: Skin is warm and dry  No erythema  Psychiatric: She has a normal mood and affect  Vitals reviewed  Goals and Barriers:  Current Goal: Prolong Survival from Cancer  Barriers: None  Patient's Capacity to Self Care:  Patient fully able to self care      Code Status: [unfilled]  Advance Directive and Living Will:      Power of :

## 2018-08-15 ENCOUNTER — TELEPHONE (OUTPATIENT)
Dept: HEMATOLOGY ONCOLOGY | Facility: CLINIC | Age: 58
End: 2018-08-15

## 2018-08-15 DIAGNOSIS — C79.9 METASTATIC MALIGNANT MELANOMA (HCC): ICD-10-CM

## 2018-08-15 DIAGNOSIS — C43.9 MALIGNANT MELANOMA, UNSPECIFIED SITE (HCC): Primary | ICD-10-CM

## 2018-08-15 NOTE — TELEPHONE ENCOUNTER
[facility] called need refill for 2 medications Tafinlan 75mg and Mekinist 0 5mg   Onc phone [phone]  Fax [phone]

## 2018-09-05 NOTE — PROGRESS NOTES
Hematology/Oncology Outpatient Follow- up Note  Dariel Nix 63 y o  female MRN: @ Encounter: [id number]        Date:  9/5/2018        Assessment / Plan:    1  Metastatic Melanoma: currently on Dabrafenib 150 mg BID along with Mekinist 1 mg PO daily every other day  Thus far she has tolerated this regimen well (she had trouble with full dose BRAF/MEK combination- had fevers, arthralgias, etc )  She had repeat imaging on 7/23/18, which continues to show stable disease, in general she has had partial response to treatment  Her labs were reviewed and are sufficient for treatment  She will follow up with us in about six weeks time with a CBC, CMP, and LDH  She is due for repeat imaging at the end of October, which will be ordered  2  Adrenal insufficiency: being followed by [address] [place] [address] endocrinology: she is currently on Hydrocortisone- 30 mg  3  Hypernatremia: sodium level was 144, I have asked her to hydrate better and start a low sodium diet  Subjective:   CC: follow up regarding metastatic melanoma      HPI:    Mrs Maddison Menchaca is a 50-year-old female who presents to us for possible participation in our adjuvant clinical trial of Ipilimumab vs  Nivolumab  Mrs Maddison Menchaca initially noticed a mole on her foot in December, however she felt it was a regular mole at that time  By spring the mole had grown and changed in appearance  She also had a few episodes where 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 had 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 causing the lesion to bleed  She presented to her dermatologist who debrided and resected the lesion  This was positive for malignant melanoma and had a Jimbo's level IV lesion with maximum thickness of 2 35 mm, it was ulcerated, and had a mitotic rate of 13 mm/m2  There was no evidence of lymphovascular invasion  On 7/28/15 she had a wide excision with sentinel lymph node biopsy 1/2 lymph nodes were positive   She subsequently had a PET/CT on 8/5/15, which did not reveal any distant metastatic disease, however she did have a hypermetabolic right popliteal fossa lymph node  On 8/13/15 she had a right groin lymph node dissection, 1/6 of the superficial groin nodes was positive for macrometastatic disease  All other lymph nodes removed were negative  The patient has stage IIIC disease  he patient was in the process of being worked up for the [facility] 209-238 clinical trial looking a Ipilimumab vs  Nivolumab in the adjuvant setting as she does have stage IIIC disease  A PET/CT scan, part of the staging requirements for the clinical trial, showed hypermetabolic activity posterior to the distal right femur  On 10/1/15 She had a resection of the femoral deep nodule and it was consistent with malignant melanoma  The patient was found to have recurrent disease in January 2016, she was unblinded from the [facility] 209-238 clinical trial and was randomized to Ipilimumab  She started on Pembrolizumab on 2/10/16  She was found to have disease progression again and started treatment with Dabrafenib and Mekinist on 8/8/16, she unfortunately had a lot of issues with pyrexia  We switched her to Vemurafenib along with Cotellic on 8/49/99  She developed high fevers, rash, nausea, and vomiting  In October 2016 we switched her back to Dabrafenib 75 mg BID along with Mekinist 1 mg PO every other day In November 2016  Interval History:    Overall Linus Trinidad is doing well  She has a good energy level with an ECOG performance status of zero  She has a good appetite and her weight has been stable  She is slightly constipated and is taking Metamuxil as needed  She continues to take 30 mg of Hydrocortisone daily  She denies fevers, chills, CP, SOB ,N/V, diarrhea, all other ROS are unremarkable  PFSh was reviewed        Cancer Staging:  metastatic melanoma to her lung, liver    BRAF: mutated    Previous Hematologic/ Oncologic History:    7/9/15 resection of lesion revealing malignant melanoma, Jimbo'es level IV lesion maximum thickness of 2 35 mm, ulcerated, mitotic rate of 13 mm/m2  1  7/28/15 wide excision of foot and right groin sentinel lymph node biopsy 1/2 lymph nodes were positive  2  8/13/15: right groin lymph node dissection 1/6 right superficial groin nodes were positive  All other nodes were negative  3  10/1/15 Radical resection of malignant soft tissue neoplasm of distal deep femoral region  4  -238 adjuvant clinical trial with Ipilimumab vs  Nivolumab, first treatment was 11/2/15, randomization was broken in January 2016, she was receiving Ipilimumab  5  2/12/16 adrenal insufficiency and hypophysitis- started hydrocortisone 20 mg in the am, 10 mg in the pm  Pembrolizumab 2 mg/kg every three weeks, first treatment was on 2/10/16, last dose was on 7/18/16  6  STarted Dabrafenib 150 mg every 12 hours PO along with Mekinist 2 mg PO daily on 8/8/16, stopped on 8/22/16, resumed on 9/2/16 at dose reduction: Dabrafenib 75 mg BID along with Mekinist 2 mg PO daily, drug stopped on 9/3/16 secondary to nausea, vomiting, and pyrexia  7  STarted Vemurafenib 960 mg PO every 12 hours along with Cotellic 60 mg PO daily three weeks on and one week off on 9/16/16, stopped on 9/27/16 secondary to nausea, vomiting, grade 4 maculopapular rash, and high fevers  8 October 24, 2016 Started Dabrafenib 75 mg BID, Mekinist 1 mg PO every other day    Current Hematologic/ Oncologic Treatment:    Dabrafenib 150 mg BID plus Mekinist 1 mg PO daily every other day, started this regimen in November 2016          Test Results:    Imaging: No results found      Labs:   Lab Results   Component Value Date    WBC 7 50 08/08/2016    HGB 13 3 08/08/2016    HCT 41 3 08/08/2016    MCV 89 08/08/2016     08/08/2016     Lab Results   Component Value Date     08/08/2016    K 3 8 08/08/2016     08/08/2016    CO2 33 (H) 08/08/2016    ANIONGAP 8 12/28/2015    BUN 5 08/08/2016    CREATININE 0 65 08/08/2016    GLUCOSE 87 12/28/2015    CALCIUM 9 2 08/08/2016    AST 12 08/08/2016    ALT 14 08/08/2016 ALKPHOS 83 08/08/2016    PROT 7 9 12/28/2015    BILITOT 0 21 12/28/2015    EGFR >60 0 08/08/2016         No results found for: SPEP, UPEP    No results found for: PSA    No results found for: CEA    No results found for:     No results found for: AFP    No results found for: IRON, TIBC, FERRITIN    No results found for: [id number]      ROS: Review of Systems   Constitutional: Negative  HENT: Negative  Eyes: Negative  Respiratory: Negative  Cardiovascular: Negative  Gastrointestinal: Positive for constipation  Endocrine: Negative  Genitourinary: Negative  Musculoskeletal: Negative  Skin: Negative  Allergic/Immunologic: Negative  Neurological: Negative  Hematological: Negative  Psychiatric/Behavioral: Negative  Current Medications: Reviewed  Allergies: Reviewed  PMH/FH/SH:  Reviewed      Physical Exam:    There is no height or weight on file to calculate BSA  Wt Readings from Last 3 Encounters:   08/08/18 78 5 kg (173 lb)   07/06/18 79 4 kg (175 lb)   06/08/18 78 9 kg (174 lb)        Temp Readings from Last 3 Encounters:   08/08/18 99 4 °F (37 4 °C) (Tympanic)   07/06/18 97 8 °F (36 6 °C) (Tympanic)   06/08/18 97 6 °F (36 4 °C) (Oral)        BP Readings from Last 3 Encounters:   08/08/18 120/80   07/06/18 124/78   06/08/18 126/82         Pulse Readings from Last 3 Encounters:   08/08/18 85   07/06/18 61   06/08/18 64     @LASTSAO2(3)@      Physical Exam   Constitutional: She is oriented to person, place, and time  She appears well-developed and well-nourished  HENT:   Head: Normocephalic and atraumatic  Nose: Nose normal    Mouth/Throat: Oropharynx is clear and moist    Eyes: Conjunctivae and EOM are normal  Pupils are equal, round, and reactive to light  Neck: Normal range of motion  Neck supple  Cardiovascular: Normal rate, regular rhythm and normal heart sounds  Pulmonary/Chest: Effort normal and breath sounds normal    Abdominal: Soft   Bowel sounds are normal    Musculoskeletal: Normal range of motion  Stable edema in right lower extremity   Neurological: She is alert and oriented to person, place, and time  She has normal reflexes  Skin: Skin is warm and dry  Psychiatric: She has a normal mood and affect  Judgment normal    Vitals reviewed  Goals and Barriers:  Current Goal: Prolong Survival from Cancer  Barriers: None  Patient's Capacity to Self Care:  Patient fully able to self care      Code Status: [unfilled]  Advance Directive and Living Will:      Power of :

## 2018-09-07 ENCOUNTER — OFFICE VISIT (OUTPATIENT)
Dept: HEMATOLOGY ONCOLOGY | Facility: CLINIC | Age: 58
End: 2018-09-07
Payer: COMMERCIAL

## 2018-09-07 VITALS
BODY MASS INDEX: 29.71 KG/M2 | WEIGHT: 174 LBS | DIASTOLIC BLOOD PRESSURE: 98 MMHG | HEIGHT: 64 IN | OXYGEN SATURATION: 98 % | RESPIRATION RATE: 14 BRPM | HEART RATE: 80 BPM | TEMPERATURE: 97.7 F | SYSTOLIC BLOOD PRESSURE: 154 MMHG

## 2018-09-07 DIAGNOSIS — C79.9 METASTATIC MALIGNANT MELANOMA (HCC): ICD-10-CM

## 2018-09-07 DIAGNOSIS — C79.9 METASTATIC MELANOMA (HCC): ICD-10-CM

## 2018-09-07 DIAGNOSIS — C78.00 METASTATIC MELANOMA TO LUNG, UNSPECIFIED LATERALITY (HCC): Primary | ICD-10-CM

## 2018-09-07 DIAGNOSIS — C43.9 MALIGNANT MELANOMA, UNSPECIFIED SITE (HCC): ICD-10-CM

## 2018-09-07 PROCEDURE — 99214 OFFICE O/P EST MOD 30 MIN: CPT | Performed by: SPECIALIST

## 2018-09-07 NOTE — LETTER
September 7, 2018     Rajwinder Hernandez MD  [phone]    Patient: Jose Lucio   YOB: 1960   Date of Visit: 9/7/2018       Dear Dr Bhaskar Hunter: Thank you for referring Yvette Mabry to me for evaluation  Below are my notes for this consultation  If you have questions, please do not hesitate to call me  I look forward to following your patient along with you  Sincerely,        Remi Casiano MD        CC: No Recipients  Anitra Orellana PA-C  9/7/2018 11:47 AM  Sign at close encounter  Hematology/Oncology Outpatient Follow- up Note  Jose Lucio 63 y o  female MRN: @ Encounter: [id number]        Date:  9/5/2018        Assessment / Plan:    1  Metastatic Melanoma: currently on Dabrafenib 150 mg BID along with Mekinist 1 mg PO daily every other day  Thus far she has tolerated this regimen well (she had trouble with full dose BRAF/MEK combination- had fevers, arthralgias, etc )  She had repeat imaging on 7/23/18, which continues to show stable disease, in general she has had partial response to treatment  Her labs were reviewed and are sufficient for treatment  She will follow up with us in about six weeks time with a CBC, CMP, and LDH  She is due for repeat imaging at the end of October, which will be ordered  2  Adrenal insufficiency: being followed by [place] endocrinology: she is currently on Hydrocortisone- 30 mg  3  Hypernatremia: sodium level was 144, I have asked her to hydrate better and start a low sodium diet  Subjective:   CC: follow up regarding metastatic melanoma      HPI:    Mrs Angelita Mishra is a 52-year-old female who presents to us for possible participation in our adjuvant clinical trial of Ipilimumab vs  Nivolumab  Mrs Angelita Mishra initially noticed a mole on her foot in December, however she felt it was a regular mole at that time  By spring the mole had grown and changed in appearance   She also had a few episodes where 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 had 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 causing the lesion to bleed  She presented to her dermatologist who debrided and resected the lesion  This was positive for malignant melanoma and had a Jimbo's level IV lesion with maximum thickness of 2 35 mm, it was ulcerated, and had a mitotic rate of 13 mm/m2  There was no evidence of lymphovascular invasion  On 7/28/15 she had a wide excision with sentinel lymph node biopsy 1/2 lymph nodes were positive  She subsequently had a PET/CT on 8/5/15, which did not reveal any distant metastatic disease, however she did have a hypermetabolic right popliteal fossa lymph node  On 8/13/15 she had a right groin lymph node dissection, 1/6 of the superficial groin nodes was positive for macrometastatic disease  All other lymph nodes removed were negative  The patient has stage IIIC disease  he patient was in the process of being worked up for the [facility] 209-238 clinical trial looking a Ipilimumab vs  Nivolumab in the adjuvant setting as she does have stage IIIC disease  A PET/CT scan, part of the staging requirements for the clinical trial, showed hypermetabolic activity posterior to the distal right femur  On 10/1/15 She had a resection of the femoral deep nodule and it was consistent with malignant melanoma  The patient was found to have recurrent disease in January 2016, she was unblinded from the [facility] 209-238 clinical trial and was randomized to Ipilimumab  She started on Pembrolizumab on 2/10/16  She was found to have disease progression again and started treatment with Dabrafenib and Mekinist on 8/8/16, she unfortunately had a lot of issues with pyrexia  We switched her to Vemurafenib along with Cotellic on 7/62/74  She developed high fevers, rash, nausea, and vomiting  In October 2016 we switched her back to Dabrafenib 75 mg BID along with Mekinist 1 mg PO every other day In November 2016  Interval History:    Overall Jin Hardin is doing well   She has a good energy level with an ECOG performance status of zero  She has a good appetite and her weight has been stable  She is slightly constipated and is taking Metamuxil as needed  She continues to take 30 mg of Hydrocortisone daily  She denies fevers, chills, CP, SOB ,N/V, diarrhea, all other ROS are unremarkable  PFSh was reviewed  Cancer Staging:  metastatic melanoma to her lung, liver    BRAF: mutated    Previous Hematologic/ Oncologic History:    7/9/15 resection of lesion revealing malignant melanoma, Jimbo'es level IV lesion maximum thickness of 2 35 mm, ulcerated, mitotic rate of 13 mm/m2  1  7/28/15 wide excision of foot and right groin sentinel lymph node biopsy 1/2 lymph nodes were positive  2  8/13/15: right groin lymph node dissection 1/6 right superficial groin nodes were positive  All other nodes were negative  3  10/1/15 Radical resection of malignant soft tissue neoplasm of distal deep femoral region  4  [facility] [place] [place] trial with Ipilimumab vs  Nivolumab, first treatment was 11/2/15, randomization was broken in January 2016, she was receiving Ipilimumab  5  2/12/16 adrenal insufficiency and hypophysitis- started hydrocortisone 20 mg in the am, 10 mg in the pm  Pembrolizumab 2 mg/kg every three weeks, first treatment was on 2/10/16, last dose was on 7/18/16  6  STarted Dabrafenib 150 mg every 12 hours PO along with Mekinist 2 mg PO daily on 8/8/16, stopped on 8/22/16, resumed on 9/2/16 at dose reduction: Dabrafenib 75 mg BID along with Mekinist 2 mg PO daily, drug stopped on 9/3/16 secondary to nausea, vomiting, and pyrexia  7  STarted Vemurafenib 960 mg PO every 12 hours along with Cotellic 60 mg PO daily three weeks on and one week off on 9/16/16, stopped on 9/27/16 secondary to nausea, vomiting, grade 4 maculopapular rash, and high fevers  8 October 24, 2016 Started Dabrafenib 75 mg BID, Mekinist 1 mg PO every other day    Current Hematologic/ Oncologic Treatment:    Dabrafenib 150 mg BID plus Mekinist 1 mg PO daily every other day, started this regimen in November 2016          Test Results:    Imaging: No results found  Labs:   Lab Results   Component Value Date    WBC 7 50 08/08/2016    HGB 13 3 08/08/2016    HCT 41 3 08/08/2016    MCV 89 08/08/2016     08/08/2016     Lab Results   Component Value Date     08/08/2016    K 3 8 08/08/2016     08/08/2016    CO2 33 (H) 08/08/2016    ANIONGAP 8 12/28/2015    BUN 5 08/08/2016    CREATININE 0 65 08/08/2016    GLUCOSE 87 12/28/2015    CALCIUM 9 2 08/08/2016    AST 12 08/08/2016    ALT 14 08/08/2016    ALKPHOS 83 08/08/2016    PROT 7 9 12/28/2015    BILITOT 0 21 12/28/2015    EGFR >60 0 08/08/2016         No results found for: SPEP, UPEP    No results found for: PSA    No results found for: CEA    No results found for:     No results found for: AFP    No results found for: IRON, TIBC, FERRITIN    No results found for: [id number]      ROS: Review of Systems   Constitutional: Negative  HENT: Negative  Eyes: Negative  Respiratory: Negative  Cardiovascular: Negative  Gastrointestinal: Positive for constipation  Endocrine: Negative  Genitourinary: Negative  Musculoskeletal: Negative  Skin: Negative  Allergic/Immunologic: Negative  Neurological: Negative  Hematological: Negative  Psychiatric/Behavioral: Negative  Current Medications: Reviewed  Allergies: Reviewed  PMH/FH/SH:  Reviewed      Physical Exam:    There is no height or weight on file to calculate BSA      Wt Readings from Last 3 Encounters:   08/08/18 78 5 kg (173 lb)   07/06/18 79 4 kg (175 lb)   06/08/18 78 9 kg (174 lb)        Temp Readings from Last 3 Encounters:   08/08/18 99 4 °F (37 4 °C) (Tympanic)   07/06/18 97 8 °F (36 6 °C) (Tympanic)   06/08/18 97 6 °F (36 4 °C) (Oral)        BP Readings from Last 3 Encounters:   08/08/18 120/80   07/06/18 124/78   06/08/18 126/82         Pulse Readings from Last 3 Encounters:   08/08/18 85   07/06/18 61   06/08/18 64 @[id number](4)@      Physical Exam   Constitutional: She is oriented to person, place, and time  She appears well-developed and well-nourished  HENT:   Head: Normocephalic and atraumatic  Nose: Nose normal    Mouth/Throat: Oropharynx is clear and moist    Eyes: Conjunctivae and EOM are normal  Pupils are equal, round, and reactive to light  Neck: Normal range of motion  Neck supple  Cardiovascular: Normal rate, regular rhythm and normal heart sounds  Pulmonary/Chest: Effort normal and breath sounds normal    Abdominal: Soft  Bowel sounds are normal    Musculoskeletal: Normal range of motion  Stable edema in right lower extremity   Neurological: She is alert and oriented to person, place, and time  She has normal reflexes  Skin: Skin is warm and dry  Psychiatric: She has a normal mood and affect  Judgment normal    Vitals reviewed  Goals and Barriers:  Current Goal: Prolong Survival from Cancer  Barriers: None  Patient's Capacity to Self Care:  Patient fully able to self care      Code Status: [unfilled]  Advance Directive and Living Will:      Power of :

## 2018-10-10 ENCOUNTER — TELEPHONE (OUTPATIENT)
Dept: HEMATOLOGY ONCOLOGY | Facility: CLINIC | Age: 58
End: 2018-10-10

## 2018-10-10 NOTE — TELEPHONE ENCOUNTER
Filiberto Montalvo from insurance center with [facility] calling for Ira Anderson on CT C/A/P with contrast ordered by MONA Garcia  Will send email to financial group

## 2018-10-10 NOTE — TELEPHONE ENCOUNTER
Noted that the email was sent to the oncology finance group and this was being worked on by [employer]

## 2018-11-02 NOTE — PROGRESS NOTES
Hematology/Oncology Outpatient Follow- up Note  Yuli Ruth 63 y o  female MRN: @ Encounter: [id number]        Date:  11/2/2018        Assessment / Plan:    1  Metastatic Melanoma: currently on Dabrafenib 150 mg BID along with Mekinist 1 mg PO daily every other day  Thus far she has tolerated this regimen well (she had trouble with full dose BRAF/MEK combination- had fevers, arthralgias, etc )  She had repeat imaging on 10/24/18, which continues to show stable disease, in general she has had partial response to treatment  Her labs were reviewed and are sufficient for treatment  She will follow up with us in about one month with a CBC, CMP, and LDH  She is due for repeat imaging in January  She has been on targeted therapy for two years  We did discuss possibly taking a break from treatment at some point, which she does not want to do at this time  2  Adrenal insufficiency: being followed by [place] [facility]: she is currently on Hydrocortisone- 30 mg        Subjective:   CC: follow up regarding metastatic melanoma      HPI:    Mrs Jarek Aaron is a 60-year-old female who presents to us for possible participation in our adjuvant clinical trial of Ipilimumab vs  Nivolumab  Mrs Jarek Aaron initially noticed a mole on her foot in December, however she felt it was a regular mole at that time  By spring the mole had grown and changed in appearance  She also had a few episodes where 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 had 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 causing the lesion to bleed  She presented to her dermatologist who debrided and resected the lesion  This was positive for malignant melanoma and had a Jimbo's level IV lesion with maximum thickness of 2 35 mm, it was ulcerated, and had a mitotic rate of 13 mm/m2  There was no evidence of lymphovascular invasion  On 7/28/15 she had a wide excision with sentinel lymph node biopsy 1/2 lymph nodes were positive   She subsequently had a PET/CT on 8/5/15, which did not reveal any distant metastatic disease, however she did have a hypermetabolic right popliteal fossa lymph node  On 8/13/15 she had a right groin lymph node dissection, 1/6 of the superficial groin nodes was positive for macrometastatic disease  All other lymph nodes removed were negative  The patient has stage IIIC disease  he patient was in the process of being worked up for the [facility] 209-238 clinical trial looking a Ipilimumab vs  Nivolumab in the adjuvant setting as she does have stage IIIC disease  A PET/CT scan, part of the staging requirements for the clinical trial, showed hypermetabolic activity posterior to the distal right femur  On 10/1/15 She had a resection of the femoral deep nodule and it was consistent with malignant melanoma  The patient was found to have recurrent disease in January 2016, she was unblinded from the [facility] 209-238 clinical trial and was randomized to Ipilimumab  She started on Pembrolizumab on 2/10/16  She was found to have disease progression again and started treatment with Dabrafenib and Mekinist on 8/8/16, she unfortunately had a lot of issues with pyrexia  We switched her to Vemurafenib along with Cotellic on 4/47/63  She developed high fevers, rash, nausea, and vomiting  In October 2016 we switched her back to Dabrafenib 75 mg BID along with Mekinist 1 mg PO every other day In November 2016  Interval History:    [facility]  She has a good energy level with an ECOG performance status of zero  She has a good appetite and her weight has been stable  She continues to take 30 mg of Hydrocortisone, which she has tolerated well  She denies fevers, chills, CP, SOB, N/V< diarrhea, all other ROS are unremarkable  PFSh was reviewed        Cancer Staging:  metastatic melanoma to her lung, liver    BRAF: mutated    Previous Hematologic/ Oncologic History:    7/9/15 resection of lesion revealing malignant melanoma, Jimbo'es level IV lesion maximum thickness of 2 35 mm, ulcerated, mitotic rate of 13 mm/m2  1  7/28/15 wide excision of foot and right groin sentinel lymph node biopsy 1/2 lymph nodes were positive  2  8/13/15: right groin lymph node dissection 1/6 right superficial groin nodes were positive  All other nodes were negative  3  10/1/15 Radical resection of malignant soft tissue neoplasm of distal deep femoral region  4  -343 adjuvant clinical trial with Ipilimumab vs  Nivolumab, first treatment was 11/2/15, randomization was broken in January 2016, she was receiving Ipilimumab  5  2/12/16 adrenal insufficiency and hypophysitis- started hydrocortisone 20 mg in the am, 10 mg in the pm  Pembrolizumab 2 mg/kg every three weeks, first treatment was on 2/10/16, last dose was on 7/18/16  6  STarted Dabrafenib 150 mg every 12 hours PO along with Mekinist 2 mg PO daily on 8/8/16, stopped on 8/22/16, resumed on 9/2/16 at dose reduction: Dabrafenib 75 mg BID along with Mekinist 2 mg PO daily, drug stopped on 9/3/16 secondary to nausea, vomiting, and pyrexia  7  STarted Vemurafenib 960 mg PO every 12 hours along with Cotellic 60 mg PO daily three weeks on and one week off on 9/16/16, stopped on 9/27/16 secondary to nausea, vomiting, grade 4 maculopapular rash, and high fevers  8 October 24, 2016 Started Dabrafenib 75 mg BID, Mekinist 1 mg PO every other day    Current Hematologic/ Oncologic Treatment:    Dabrafenib 150 mg BID plus Mekinist 1 mg PO daily every other day, started this regimen in November 2016           Test Results:    Imaging: No results found      Labs:   Lab Results   Component Value Date    WBC 7 50 08/08/2016    HGB 13 3 08/08/2016    HCT 41 3 08/08/2016    MCV 89 08/08/2016     08/08/2016     Lab Results   Component Value Date     12/28/2015    K 3 8 08/08/2016     08/08/2016    CO2 33 (H) 08/08/2016    ANIONGAP 8 12/28/2015    BUN 5 08/08/2016    CREATININE 0 65 08/08/2016    GLUCOSE 87 12/28/2015    CALCIUM 9 2 08/08/2016    AST 12 08/08/2016    ALT 14 08/08/2016    ALKPHOS 83 08/08/2016    PROT 7 9 12/28/2015    BILITOT 0 21 12/28/2015    EGFR >60 0 08/08/2016         No results found for: SPEP, UPEP    No results found for: PSA    No results found for: CEA    No results found for:     No results found for: AFP    No results found for: IRON, TIBC, FERRITIN    No results found for: [id number]      ROS: Review of Systems   Constitutional: Negative  HENT: Negative  Eyes: Negative  Respiratory: Negative  Cardiovascular: Negative  Gastrointestinal: Negative  Endocrine: Negative  Genitourinary: Negative  Musculoskeletal: Negative  Skin: Negative  Allergic/Immunologic: Negative  Neurological: Negative  Hematological: Negative  Psychiatric/Behavioral: Negative  Current Medications: Reviewed  Allergies: Reviewed  PMH/FH/SH:  Reviewed      Physical Exam:    There is no height or weight on file to calculate BSA  Wt Readings from Last 3 Encounters:   09/07/18 78 9 kg (174 lb)   08/08/18 78 5 kg (173 lb)   07/06/18 79 4 kg (175 lb)        Temp Readings from Last 3 Encounters:   09/07/18 97 7 °F (36 5 °C)   08/08/18 99 4 °F (37 4 °C) (Tympanic)   07/06/18 97 8 °F (36 6 °C) (Tympanic)        BP Readings from Last 3 Encounters:   09/07/18 154/98   08/08/18 120/80   07/06/18 124/78         Pulse Readings from Last 3 Encounters:   09/07/18 80   08/08/18 85   07/06/18 61     @LASTSAO2(3)@      Physical Exam   Constitutional: She is oriented to person, place, and time  She appears well-developed and well-nourished  HENT:   Head: Normocephalic and atraumatic  Mouth/Throat: Oropharynx is clear and moist    Eyes: Pupils are equal, round, and reactive to light  Conjunctivae and EOM are normal    Neck: Normal range of motion  Neck supple  No thyromegaly present  Cardiovascular: Normal rate, regular rhythm and normal heart sounds  Pulmonary/Chest: Effort normal and breath sounds normal    Abdominal: Soft   Bowel sounds are normal  She exhibits no distension and no mass  There is no tenderness  Musculoskeletal: Normal range of motion  She exhibits no edema  Slight right leg edema, stable   Lymphadenopathy:     She has no cervical adenopathy  Neurological: She is alert and oriented to person, place, and time  She has normal reflexes  Skin: Skin is warm and dry  No erythema  Psychiatric: She has a normal mood and affect  Vitals reviewed  Goals and Barriers:  Current Goal: Prolong Survival from Cancer  Barriers: None  Patient's Capacity to Self Care:  Patient fully able to self care      Code [facility]: [facility]@  Advance Directive and Living Will:      Power of :

## 2018-11-05 ENCOUNTER — OFFICE VISIT (OUTPATIENT)
Dept: HEMATOLOGY ONCOLOGY | Facility: CLINIC | Age: 58
End: 2018-11-05
Payer: COMMERCIAL

## 2018-11-05 VITALS
WEIGHT: 171.5 LBS | RESPIRATION RATE: 14 BRPM | OXYGEN SATURATION: 98 % | DIASTOLIC BLOOD PRESSURE: 80 MMHG | HEART RATE: 88 BPM | HEIGHT: 64 IN | SYSTOLIC BLOOD PRESSURE: 118 MMHG | BODY MASS INDEX: 29.28 KG/M2 | TEMPERATURE: 97.6 F

## 2018-11-05 DIAGNOSIS — C79.9 METASTATIC MALIGNANT MELANOMA (HCC): ICD-10-CM

## 2018-11-05 DIAGNOSIS — C43.9 MALIGNANT MELANOMA, UNSPECIFIED SITE (HCC): ICD-10-CM

## 2018-11-05 DIAGNOSIS — C78.00 METASTATIC MELANOMA TO LUNG, UNSPECIFIED LATERALITY (HCC): Primary | ICD-10-CM

## 2018-11-05 DIAGNOSIS — C79.9 METASTATIC MELANOMA (HCC): ICD-10-CM

## 2018-11-05 PROCEDURE — 99215 OFFICE O/P EST HI 40 MIN: CPT | Performed by: SPECIALIST

## 2018-11-05 NOTE — LETTER
November 5, 2018     Seth Sinclair MD  [id number]    Patient: En Hicks   YOB: 1960   Date of Visit: 11/5/2018       Dear Dr Guillermo [place]: Thank you for referring Matt Gan to me for evaluation  Below are my notes for this consultation  If you have questions, please do not hesitate to call me  I look forward to following your patient along with you  Sincerely,        Aubrie Rea MD        CC: No Recipients  Aubrie Rea MD  11/5/2018 11:27 AM  Sign at close encounter  Hematology/Oncology Outpatient Follow- up Note  En Hicks 63 y o  female MRN: @ Encounter: [id number]        Date:  11/2/2018        Assessment / Plan:    1  Metastatic Melanoma: currently on Dabrafenib 150 mg BID along with Mekinist 1 mg PO daily every other day  Thus far she has tolerated this regimen well (she had trouble with full dose BRAF/MEK combination- had fevers, arthralgias, etc )  She had repeat imaging on 10/24/18, which continues to show stable disease, in general she has had partial response to treatment  Her labs were reviewed and are sufficient for treatment  She will follow up with us in about one month with a CBC, CMP, and LDH  She is due for repeat imaging in January  She has been on targeted therapy for two years  We did discuss possibly taking a break from treatment at some point, which she does not want to do at this time  2  Adrenal insufficiency: being followed by [place] [facility]: she is currently on Hydrocortisone- 30 mg        Subjective:   CC: follow up regarding metastatic melanoma      HPI:    Mrs Marquis Dueñas is a 52-year-old female who presents to us for possible participation in our adjuvant clinical trial of Ipilimumab vs  Nivolumab  Mrs Marquis Dueñas initially noticed a mole on her foot in December, however she felt it was a regular mole at that time  By spring the mole had grown and changed in appearance   She also had a few episodes where 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 had 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 causing the lesion to bleed  She presented to her dermatologist who debrided and resected the lesion  This was positive for malignant melanoma and had a Jimbo's level IV lesion with maximum thickness of 2 35 mm, it was ulcerated, and had a mitotic rate of 13 mm/m2  There was no evidence of lymphovascular invasion  On 7/28/15 she had a wide excision with sentinel lymph node biopsy 1/2 lymph nodes were positive  She subsequently had a PET/CT on 8/5/15, which did not reveal any distant metastatic disease, however she did have a hypermetabolic right popliteal fossa lymph node  On 8/13/15 she had a right groin lymph node dissection, 1/6 of the superficial groin nodes was positive for macrometastatic disease  All other lymph nodes removed were negative  The patient has stage IIIC disease  he patient was in the process of being worked up for the [facility] 209-238 clinical trial looking a Ipilimumab vs  Nivolumab in the adjuvant setting as she does have stage IIIC disease  A PET/CT scan, part of the staging requirements for the clinical trial, showed hypermetabolic activity posterior to the distal right femur  On 10/1/15 She had a resection of the femoral deep nodule and it was consistent with malignant melanoma  The patient was found to have recurrent disease in January 2016, she was unblinded from the [facility] 209-238 clinical trial and was randomized to Ipilimumab  She started on Pembrolizumab on 2/10/16  She was found to have disease progression again and started treatment with Dabrafenib and Mekinist on 8/8/16, she unfortunately had a lot of issues with pyrexia  We switched her to Vemurafenib along with Cotellic on 6/75/48  She developed high fevers, rash, nausea, and vomiting  In October 2016 we switched her back to Dabrafenib 75 mg BID along with Mekinist 1 mg PO every other day In November 2016  Interval History:    Overall Rosie Lauren is doing well   She has a good energy level with an ECOG performance status of zero  She has a good appetite and her weight has been stable  She continues to take 30 mg of Hydrocortisone, which she has tolerated well  She denies fevers, chills, CP, SOB, N/V< diarrhea, all other ROS are unremarkable  PFSh was reviewed  Cancer Staging:  metastatic melanoma to her lung, liver    BRAF: mutated    Previous Hematologic/ Oncologic History:    7/9/15 resection of lesion revealing malignant melanoma, Jimbo'es level IV lesion maximum thickness of 2 35 mm, ulcerated, mitotic rate of 13 mm/m2  1  7/28/15 wide excision of foot and right groin sentinel lymph node biopsy 1/2 lymph nodes were positive  2  8/13/15: right groin lymph node dissection 1/6 right superficial groin nodes were positive  All other nodes were negative  3  10/1/15 Radical resection of malignant soft tissue neoplasm of distal deep femoral region  4  [facility] 209-238 adjuvant clinical trial with Ipilimumab vs  Nivolumab, first treatment was 11/2/15, randomization was broken in January 2016, she was receiving Ipilimumab  5  2/12/16 adrenal insufficiency and hypophysitis- started hydrocortisone 20 mg in the am, 10 mg in the pm  Pembrolizumab 2 mg/kg every three weeks, first treatment was on 2/10/16, last dose was on 7/18/16  6  STarted Dabrafenib 150 mg every 12 hours PO along with Mekinist 2 mg PO daily on 8/8/16, stopped on 8/22/16, resumed on 9/2/16 at dose reduction: Dabrafenib 75 mg BID along with Mekinist 2 mg PO daily, drug stopped on 9/3/16 secondary to nausea, vomiting, and pyrexia  7  STarted Vemurafenib 960 mg PO every 12 hours along with Cotellic 60 mg PO daily three weeks on and one week off on 9/16/16, stopped on 9/27/16 secondary to nausea, vomiting, grade 4 maculopapular rash, and high fevers  8 October 24, 2016 Started Dabrafenib 75 mg BID, Mekinist 1 mg PO every other day    Current Hematologic/ Oncologic Treatment:    Dabrafenib 150 mg BID plus Mekinist 1 mg PO daily every other day, started this regimen in November 2016           Test Results:    Imaging: No results found  Labs:   Lab Results   Component Value Date    WBC 7 50 08/08/2016    HGB 13 3 08/08/2016    HCT 41 3 08/08/2016    MCV 89 08/08/2016     08/08/2016     Lab Results   Component Value Date     12/28/2015    K 3 8 08/08/2016     08/08/2016    CO2 33 (H) 08/08/2016    ANIONGAP 8 12/28/2015    BUN 5 08/08/2016    CREATININE 0 65 08/08/2016    GLUCOSE 87 12/28/2015    CALCIUM 9 2 08/08/2016    AST 12 08/08/2016    ALT 14 08/08/2016    ALKPHOS 83 08/08/2016    PROT 7 9 12/28/2015    BILITOT 0 21 12/28/2015    EGFR >60 0 08/08/2016         No results found for: SPEP, UPEP    No results found for: PSA    No results found for: CEA    No results found for:     No results found for: AFP    No results found for: IRON, TIBC, FERRITIN    No results found for: [id number]      ROS: Review of Systems   Constitutional: Negative  HENT: Negative  Eyes: Negative  Respiratory: Negative  Cardiovascular: Negative  Gastrointestinal: Negative  Endocrine: Negative  Genitourinary: Negative  Musculoskeletal: Negative  Skin: Negative  Allergic/Immunologic: Negative  Neurological: Negative  Hematological: Negative  Psychiatric/Behavioral: Negative  Current Medications: Reviewed  Allergies: Reviewed  PMH/FH/SH:  Reviewed      Physical Exam:    There is no height or weight on file to calculate BSA      Wt Readings from Last 3 Encounters:   09/07/18 78 9 kg (174 lb)   08/08/18 78 5 kg (173 lb)   07/06/18 79 4 kg (175 lb)        Temp Readings from Last 3 Encounters:   09/07/18 97 7 °F (36 5 °C)   08/08/18 99 4 °F (37 4 °C) (Tympanic)   07/06/18 97 8 °F (36 6 °C) (Tympanic)        BP Readings from Last 3 Encounters:   09/07/18 154/98   08/08/18 120/80   07/06/18 124/78         Pulse Readings from Last 3 Encounters:   09/07/18 80   08/08/18 85   07/06/18 61     @LASTSAO2(3)@      Physical Exam Constitutional: She is oriented to person, place, and time  She appears well-developed and well-nourished  HENT:   Head: Normocephalic and atraumatic  Mouth/Throat: Oropharynx is clear and moist    Eyes: Pupils are equal, round, and reactive to light  Conjunctivae and EOM are normal    Neck: Normal range of motion  Neck supple  No thyromegaly present  Cardiovascular: Normal rate, regular rhythm and normal heart sounds  Pulmonary/Chest: Effort normal and breath sounds normal    Abdominal: Soft  Bowel sounds are normal  She exhibits no distension and no mass  There is no tenderness  Musculoskeletal: Normal range of motion  She exhibits no edema  Slight right leg edema, stable   Lymphadenopathy:     She has no cervical adenopathy  Neurological: She is alert and oriented to person, place, and time  She has normal reflexes  Skin: Skin is warm and dry  No erythema  Psychiatric: She has a normal mood and affect  Vitals reviewed  Goals and Barriers:  Current Goal: Prolong Survival from Cancer  Barriers: None  Patient's Capacity to Self Care:  Patient fully able to self care      Code Status: [unfilled]  Advance Directive and Living Will:      Power of :

## 2018-11-06 DIAGNOSIS — C43.9 MALIGNANT MELANOMA, UNSPECIFIED SITE (HCC): ICD-10-CM

## 2018-11-06 NOTE — TELEPHONE ENCOUNTER
Call from Daniella Galindo pharmacist regarding Mekinist  Quantity 28 ordered but comes in quantity 30 bottle  Asking to change quantity and approval give  Verbal pended to MONA Garcia

## 2018-12-04 NOTE — PROGRESS NOTES
Hematology/Oncology Outpatient Follow- up Note  Aisha Vidla 63 y o  female MRN: @ Encounter: [id number]        Date:  12/4/2018        Assessment / Plan:    1  Metastatic Melanoma: currently on Dabrafenib 150 mg BID along with Mekinist 1 mg PO daily every other day  Thus far she has tolerated this regimen well (she had trouble with full dose BRAF/MEK combination- had fevers, arthralgias, etc )  She had repeat imaging on 10/24/18, which continues to show stable disease, in general she has had partial response to treatment  Her labs were reviewed and are sufficient for treatment  She will follow up with us in about one month with a CBC, CMP, and LDH  She is due for repeat imaging in January  She has been on targeted therapy for two years  We did discuss possibly taking a break from treatment at some point, which she does not want to do at this time  2  Adrenal insufficiency: being followed by [facility]: she is currently on Hydrocortisone- 30 mg, she will try to taper to 25 mg  Subjective:   CC: follow up regarding metastatic melanoma      HPI:    Mrs Rocky Morales is a 50-year-old female who presents to us for possible participation in our adjuvant clinical trial of Ipilimumab vs  Nivolumab  Mrs Rocky Morales initially noticed a mole on her foot in December, however she felt it was a regular mole at that time  By spring the mole had grown and changed in appearance  She also had a few episodes where 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 had 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 causing the lesion to bleed  She presented to her dermatologist who debrided and resected the lesion  This was positive for malignant melanoma and had a Jimbo's level IV lesion with maximum thickness of 2 35 mm, it was ulcerated, and had a mitotic rate of 13 mm/m2  There was no evidence of lymphovascular invasion  On 7/28/15 she had a wide excision with sentinel lymph node biopsy 1/2 lymph nodes were positive   She subsequently had a PET/CT on 8/5/15, which did not reveal any distant metastatic disease, however she did have a hypermetabolic right popliteal fossa lymph node  On 8/13/15 she had a right groin lymph node dissection, 1/6 of the superficial groin nodes was positive for macrometastatic disease  All other lymph nodes removed were negative  The patient has stage IIIC disease  he patient was in the process of being worked up for the [facility] 209-238 clinical trial looking a Ipilimumab vs  Nivolumab in the adjuvant setting as she does have stage IIIC disease  A PET/CT scan, part of the staging requirements for the clinical trial, showed hypermetabolic activity posterior to the distal right femur  On 10/1/15 She had a resection of the femoral deep nodule and it was consistent with malignant melanoma  The patient was found to have recurrent disease in January 2016, she was unblinded from the [facility] 209-238 clinical trial and was randomized to Ipilimumab  She started on Pembrolizumab on 2/10/16  She was found to have disease progression again and started treatment with Dabrafenib and Mekinist on 8/8/16, she unfortunately had a lot of issues with pyrexia  We switched her to Vemurafenib along with Cotellic on 3/95/29  She developed high fevers, rash, nausea, and vomiting  In October 2016 we switched her back to Dabrafenib 75 mg BID along with Mekinist 1 mg PO every other day In November 2016  Interval History:    Overall the patient is doing well  She has a good energy level with an ECOG performance status of zero  She has a good appetite and her weight has been stable  She has no new complaints  She denies fevers, chills, CP, SOB, N/V, diarrhea, all other ROS are unremarkable  PFSh was reviewed        Cancer Staging:  metastatic melanoma to her lung, liver    BRAF: mutated    Previous Hematologic/ Oncologic History:    7/9/15 resection of lesion revealing malignant melanoma, Jimbo'es level IV lesion maximum thickness of 2 35 mm, ulcerated, mitotic rate of 13 mm/m2  1  7/28/15 wide excision of foot and right groin sentinel lymph node biopsy 1/2 lymph nodes were positive  2  8/13/15: right groin lymph node dissection 1/6 right superficial groin nodes were positive  All other nodes were negative  3  10/1/15 Radical resection of malignant soft tissue neoplasm of distal deep femoral region  4  -170 adjuvant clinical trial with Ipilimumab vs  Nivolumab, first treatment was 11/2/15, randomization was broken in January 2016, she was receiving Ipilimumab  5  2/12/16 adrenal insufficiency and hypophysitis- started hydrocortisone 20 mg in the am, 10 mg in the pm  Pembrolizumab 2 mg/kg every three weeks, first treatment was on 2/10/16, last dose was on 7/18/16  6  STarted Dabrafenib 150 mg every 12 hours PO along with Mekinist 2 mg PO daily on 8/8/16, stopped on 8/22/16, resumed on 9/2/16 at dose reduction: Dabrafenib 75 mg BID along with Mekinist 2 mg PO daily, drug stopped on 9/3/16 secondary to nausea, vomiting, and pyrexia  7  STarted Vemurafenib 960 mg PO every 12 hours along with Cotellic 60 mg PO daily three weeks on and one week off on 9/16/16, stopped on 9/27/16 secondary to nausea, vomiting, grade 4 maculopapular rash, and high fevers  8 October 24, 2016 Started Dabrafenib 75 mg BID, Mekinist 1 mg PO every other day    Current Hematologic/ Oncologic Treatment:    Dabrafenib 150 mg BID plus Mekinist 1 mg PO daily every other day, started this regimen in November 2016          Test Results:    Imaging: No results found      Labs:   Lab Results   Component Value Date    WBC 7 50 08/08/2016    HGB 13 3 08/08/2016    HCT 41 3 08/08/2016    MCV 89 08/08/2016     08/08/2016     Lab Results   Component Value Date     12/28/2015    K 3 8 08/08/2016     08/08/2016    CO2 33 (H) 08/08/2016    ANIONGAP 8 12/28/2015    BUN 5 08/08/2016    CREATININE 0 65 08/08/2016    GLUCOSE 87 12/28/2015    CALCIUM 9 2 08/08/2016    AST 12 08/08/2016    ALT 14 08/08/2016    ALKPHOS 83 08/08/2016 PROT 7 9 12/28/2015    BILITOT 0 21 12/28/2015    EGFR >60 0 08/08/2016         No results found for: SPEP, UPEP    No results found for: PSA    No results found for: CEA    No results found for:     No results found for: AFP    No results found for: IRON, TIBC, FERRITIN    No results found for: [id number]      ROS: Review of Systems   Constitutional: Negative  HENT: Negative  Eyes: Negative  Respiratory: Negative  Cardiovascular: Negative  Gastrointestinal: Negative  Endocrine: Negative  Genitourinary: Negative  Musculoskeletal: Negative  Skin: Negative  Allergic/Immunologic: Negative  Neurological: Negative  Hematological: Negative  Psychiatric/Behavioral: Negative  Current Medications: Reviewed  Allergies: Reviewed  PMH/FH/SH:  Reviewed      Physical Exam:    There is no height or weight on file to calculate BSA  Wt Readings from Last 3 Encounters:   11/05/18 77 8 kg (171 lb 8 oz)   09/07/18 78 9 kg (174 lb)   08/08/18 78 5 kg (173 lb)        Temp Readings from Last 3 Encounters:   11/05/18 97 6 °F (36 4 °C)   09/07/18 97 7 °F (36 5 °C)   08/08/18 99 4 °F (37 4 °C) (Tympanic)        BP Readings from Last 3 Encounters:   11/05/18 118/80   09/07/18 154/98   08/08/18 120/80         Pulse Readings from Last 3 Encounters:   11/05/18 88   09/07/18 80   08/08/18 85     @LASTSAO2(3)@      Physical Exam   Constitutional: She is oriented to person, place, and time  She appears well-developed and well-nourished  HENT:   Head: Normocephalic and atraumatic  Nose: Nose normal    Mouth/Throat: Oropharynx is clear and moist    Eyes: Pupils are equal, round, and reactive to light  Conjunctivae and EOM are normal    Neck: Normal range of motion  Neck supple  Cardiovascular: Normal rate, regular rhythm and normal heart sounds  Pulmonary/Chest: Effort normal and breath sounds normal    Abdominal: Soft   Bowel sounds are normal    Musculoskeletal: Normal range of motion  Neurological: She is alert and oriented to person, place, and time  She has normal reflexes  Skin: Skin is warm and dry  Psychiatric: She has a normal mood and affect  Judgment normal    Vitals reviewed  Goals and Barriers:  Current Goal: Prolong Survival from Cancer  Barriers: None  Patient's Capacity to Self Care:  Patient fully able to self care      Code Status: [unfilled]  Advance Directive and Living Will:      Power of :

## 2018-12-07 ENCOUNTER — OFFICE VISIT (OUTPATIENT)
Dept: HEMATOLOGY ONCOLOGY | Facility: CLINIC | Age: 58
End: 2018-12-07
Payer: COMMERCIAL

## 2018-12-07 VITALS
WEIGHT: 170 LBS | RESPIRATION RATE: 14 BRPM | SYSTOLIC BLOOD PRESSURE: 120 MMHG | HEIGHT: 64 IN | HEART RATE: 80 BPM | BODY MASS INDEX: 29.02 KG/M2 | DIASTOLIC BLOOD PRESSURE: 78 MMHG | OXYGEN SATURATION: 98 % | TEMPERATURE: 97.8 F

## 2018-12-07 DIAGNOSIS — C79.9 METASTATIC MELANOMA (HCC): ICD-10-CM

## 2018-12-07 DIAGNOSIS — C78.00 METASTATIC MELANOMA TO LUNG, UNSPECIFIED LATERALITY (HCC): Primary | ICD-10-CM

## 2018-12-07 PROCEDURE — 99214 OFFICE O/P EST MOD 30 MIN: CPT | Performed by: PHYSICIAN ASSISTANT

## 2018-12-07 NOTE — LETTER
December 7, 2018     Justine Ribeiro MD  [id number]    Patient: Hermelindo Painter   YOB: 1960   Date of Visit: 12/7/2018       Dear Dr Jarocho Carson: Thank you for referring Good Liu to me for evaluation  Below are my notes for this consultation  If you have questions, please do not hesitate to call me  I look forward to following your patient along with you  Sincerely,        Mari Martin MD        CC: No Recipients  Anitra Todd PA-C  12/4/2018 10:44 AM  Sign at close encounter  Hematology/Oncology Outpatient Follow- up Note  Hermelindo Painter 63 y o  female MRN: @ Encounter: [id number]        Date:  12/4/2018        Assessment / Plan:    1  Metastatic Melanoma: currently on Dabrafenib 150 mg BID along with Mekinist 1 mg PO daily every other day  Thus far she has tolerated this regimen well (she had trouble with full dose BRAF/MEK combination- had fevers, arthralgias, etc )  She had repeat imaging on 10/24/18, which continues to show stable disease, in general she has had partial response to treatment  Her labs were reviewed and are sufficient for treatment  She will follow up with us in about one month with a CBC, CMP, and LDH  She is due for repeat imaging in January  She has been on targeted therapy for two years  We did discuss possibly taking a break from treatment at some point, which she does not want to do at this time  2  Adrenal insufficiency: being followed by [facility]: she is currently on Hydrocortisone- 30 mg        Subjective:   CC: follow up regarding metastatic melanoma      HPI:    Mrs Gurpreet Chávez is a 20-year-old female who presents to us for possible participation in our adjuvant clinical trial of Ipilimumab vs  Nivolumab  Mrs Gurpreet Chávez initially noticed a mole on her foot in December, however she felt it was a regular mole at that time  By spring the mole had grown and changed in appearance   She also had a few episodes where 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 had 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 causing the lesion to bleed  She presented to her dermatologist who debrided and resected the lesion  This was positive for malignant melanoma and had a Jimbo's level IV lesion with maximum thickness of 2 35 mm, it was ulcerated, and had a mitotic rate of 13 mm/m2  There was no evidence of lymphovascular invasion  On 7/28/15 she had a wide excision with sentinel lymph node biopsy 1/2 lymph nodes were positive  She subsequently had a PET/CT on 8/5/15, which did not reveal any distant metastatic disease, however she did have a hypermetabolic right popliteal fossa lymph node  On 8/13/15 she had a right groin lymph node dissection, 1/6 of the superficial groin nodes was positive for macrometastatic disease  All other lymph nodes removed were negative  The patient has stage IIIC disease  he patient was in the process of being worked up for the [facility] 209-238 clinical trial looking a Ipilimumab vs  Nivolumab in the adjuvant setting as she does have stage IIIC disease  A PET/CT scan, part of the staging requirements for the clinical trial, showed hypermetabolic activity posterior to the distal right femur  On 10/1/15 She had a resection of the femoral deep nodule and it was consistent with malignant melanoma  The patient was found to have recurrent disease in January 2016, she was unblinded from the [facility] 209-238 clinical trial and was randomized to Ipilimumab  She started on Pembrolizumab on 2/10/16  She was found to have disease progression again and started treatment with Dabrafenib and Mekinist on 8/8/16, she unfortunately had a lot of issues with pyrexia  We switched her to Vemurafenib along with Cotellic on 5/89/55  She developed high fevers, rash, nausea, and vomiting  In October 2016 we switched her back to Dabrafenib 75 mg BID along with Mekinist 1 mg PO every other day In November 2016      Interval History:          Cancer Staging:  metastatic melanoma to her lung, liver    BRAF: mutated    Previous Hematologic/ Oncologic History:    7/9/15 resection of lesion revealing malignant melanoma, Jimbo'es level IV lesion maximum thickness of 2 35 mm, ulcerated, mitotic rate of 13 mm/m2  1  7/28/15 wide excision of foot and right groin sentinel lymph node biopsy 1/2 lymph nodes were positive  2  8/13/15: right groin lymph node dissection 1/6 right superficial groin nodes were positive  All other nodes were negative  3  10/1/15 Radical resection of malignant soft tissue neoplasm of distal deep femoral region  4  -309 adjuvant clinical trial with Ipilimumab vs  Nivolumab, first treatment was 11/2/15, randomization was broken in January 2016, she was receiving Ipilimumab  5  2/12/16 adrenal insufficiency and hypophysitis- started hydrocortisone 20 mg in the am, 10 mg in the pm  Pembrolizumab 2 mg/kg every three weeks, first treatment was on 2/10/16, last dose was on 7/18/16  6  STarted Dabrafenib 150 mg every 12 hours PO along with Mekinist 2 mg PO daily on 8/8/16, stopped on 8/22/16, resumed on 9/2/16 at dose reduction: Dabrafenib 75 mg BID along with Mekinist 2 mg PO daily, drug stopped on 9/3/16 secondary to nausea, vomiting, and pyrexia  7  STarted Vemurafenib 960 mg PO every 12 hours along with Cotellic 60 mg PO daily three weeks on and one week off on 9/16/16, stopped on 9/27/16 secondary to nausea, vomiting, grade 4 maculopapular rash, and high fevers  8  October 24, 2016 Started Dabrafenib 75 mg BID, Mekinist 1 mg PO every other day    Current Hematologic/ Oncologic Treatment:    Dabrafenib 150 mg BID plus Mekinist 1 mg PO daily every other day, started this regimen in November 2016          Test Results:    Imaging: No results found      Labs:   Lab Results   Component Value Date    WBC 7 50 08/08/2016    HGB 13 3 08/08/2016    HCT 41 3 08/08/2016    MCV 89 08/08/2016     08/08/2016     Lab Results   Component Value Date     12/28/2015    K 3 8 08/08/2016     08/08/2016    CO2 33 (H) 08/08/2016    ANIONGAP 8 12/28/2015    BUN 5 08/08/2016    CREATININE 0 65 08/08/2016    GLUCOSE 87 12/28/2015    CALCIUM 9 2 08/08/2016    AST 12 08/08/2016    ALT 14 08/08/2016    ALKPHOS 83 08/08/2016    PROT 7 9 12/28/2015    BILITOT 0 21 12/28/2015    EGFR >60 0 08/08/2016         No results found for: SPEP, UPEP    No results found for: PSA    No results found for: CEA    No results found for:     No results found for: AFP    No results found for: IRON, TIBC, FERRITIN    No results found for: [id number]      ROS: Review of Systems      Current Medications: Reviewed  Allergies: Reviewed  PMH/FH/SH:  Reviewed      Physical Exam:    There is no height or weight on file to calculate BSA  Wt Readings from Last 3 Encounters:   11/05/18 77 8 kg (171 lb 8 oz)   09/07/18 78 9 kg (174 lb)   08/08/18 78 5 kg (173 lb)        Temp Readings from Last 3 Encounters:   11/05/18 97 6 °F (36 4 °C)   09/07/18 97 7 °F (36 5 °C)   08/08/18 99 4 °F (37 4 °C) (Tympanic)        BP Readings from Last 3 Encounters:   11/05/18 118/80   09/07/18 154/98   08/08/18 120/80         Pulse Readings from Last 3 Encounters:   11/05/18 88   09/07/18 80   08/08/18 85     @LASTSAO2(3)@      Physical Exam      Goals and Barriers:  Current Goal: Prolong Survival from Cancer  Barriers: None  Patient's Capacity to Self Care:  Patient fully able to self care      Code Status: [unfilled]  Advance Directive and Living Will:      Power of :

## 2019-01-11 ENCOUNTER — OFFICE VISIT (OUTPATIENT)
Dept: HEMATOLOGY ONCOLOGY | Facility: CLINIC | Age: 59
End: 2019-01-11
Payer: COMMERCIAL

## 2019-01-11 VITALS
RESPIRATION RATE: 14 BRPM | SYSTOLIC BLOOD PRESSURE: 126 MMHG | WEIGHT: 173 LBS | DIASTOLIC BLOOD PRESSURE: 82 MMHG | BODY MASS INDEX: 29.53 KG/M2 | HEART RATE: 72 BPM | OXYGEN SATURATION: 95 % | HEIGHT: 64 IN | TEMPERATURE: 98 F

## 2019-01-11 DIAGNOSIS — C79.9 METASTATIC MALIGNANT MELANOMA (HCC): Primary | ICD-10-CM

## 2019-01-11 PROCEDURE — 99214 OFFICE O/P EST MOD 30 MIN: CPT | Performed by: PHYSICIAN ASSISTANT

## 2019-01-11 RX ORDER — MAGNESIUM OXIDE 400 MG/1
TABLET ORAL 2 TIMES DAILY
COMMUNITY
Start: 2016-10-19

## 2019-01-11 NOTE — PROGRESS NOTES
Hematology/Oncology Outpatient Follow- up Note  Deloria Phalen 63 y o  female MRN: @ Encounter: [id number]        Date:  1/11/2019      Assessment / Plan:    1  Metastatic Melanoma: on Dabrafenib 150 mg BID +  Mekinist 1 mg PO daily every other day  She continues to tolerate these treatments without side effects  She had repeat imaging on 10/24/18, with stable disease  I have ordered imaging for next month  Laboratory studies reviewed and satisfactory for continued treatment at current dose  aking a break from treatment at some point, which she does not want to do at this time  2  Adrenal insufficiency: being followed by [facility] endocr[facility], Hydrocortisone  has been tapered down to 25 mg daily with plan to reduce further to 20 mg daily if tolerates current dose reduction  She will return in 4 weeks time for continued management of malignancy  She will contact us in the interim with any questions or concerns  Subjective:   CC: follow up regarding metastatic melanoma      HPI:    Mrs Jimmie Carvajal is a 56-year-old female who presents to us for possible participation in our adjuvant clinical trial of Ipilimumab vs  Nivolumab  Mrs Jimmie Carvajal initially noticed a mole on her foot in December, however she felt it was a regular mole at that time  By spring the mole had grown and changed in appearance  She also had a few episodes where 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 had 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 causing the lesion to bleed  She presented to her dermatologist who debrided and resected the lesion  This was positive for malignant melanoma and had a Jimbo's level IV lesion with maximum thickness of 2 35 mm, it was ulcerated, and had a mitotic rate of 13 mm/m2  There was no evidence of lymphovascular invasion  On 7/28/15 she had a wide excision with sentinel lymph node biopsy 1/2 lymph nodes were positive   She subsequently had a PET/CT on 8/5/15, which did not reveal any distant metastatic disease, however she did have a hypermetabolic right popliteal fossa lymph node  On 8/13/15 she had a right groin lymph node dissection, 1/6 of the superficial groin nodes was positive for macrometastatic disease  All other lymph nodes removed were negative  The patient has stage IIIC disease  he patient was in the process of being worked up for the [facility] 209-238 clinical trial looking a Ipilimumab vs  Nivolumab in the adjuvant setting as she does have stage IIIC disease  A PET/CT scan, part of the staging requirements for the clinical trial, showed hypermetabolic activity posterior to the distal right femur  On 10/1/15 She had a resection of the femoral deep nodule and it was consistent with malignant melanoma  The patient was found to have recurrent disease in January 2016, she was unblinded from the [facility] 209-238 clinical trial and was randomized to Ipilimumab  She started on Pembrolizumab on 2/10/16  She was found to have disease progression again and started treatment with Dabrafenib and Mekinist on 8/8/16, she unfortunately had a lot of issues with pyrexia  We switched her to Vemurafenib along with Cotellic on 8/77/47  She developed high fevers, rash, nausea, and vomiting  In October 2016 we switched her back to Dabrafenib 75 mg BID along with Mekinist 1 mg PO every other day In November 2016  Interval History:    1/11/2019:  She reports feeling "very well"  She does not have any complaints today  She manages chronicdependent RLE edema with compression stockings and elevation  Cancer Staging:  metastatic melanoma to her lung, liver    BRAF: mutated    Previous Hematologic/ Oncologic History:    7/9/15 resection of lesion revealing malignant melanoma, Jimbo'es level IV lesion maximum thickness of 2 35 mm, ulcerated, mitotic rate of 13 mm/m2  1  7/28/15 wide excision of foot and right groin sentinel lymph node biopsy 1/2 lymph nodes were positive  2  8/13/15: right groin lymph node dissection 1/6 right superficial groin nodes were positive   All other nodes were negative  3  10/1/15 Radical resection of malignant soft tissue neoplasm of distal deep femoral region  4  -238 adjuvant clinical trial with Ipilimumab vs  Nivolumab, first treatment was 11/2/15, randomization was broken in January 2016, she was receiving Ipilimumab  5  2/12/16 adrenal insufficiency and hypophysitis- started hydrocortisone 20 mg in the am, 10 mg in the pm  Pembrolizumab 2 mg/kg every three weeks, first treatment was on 2/10/16, last dose was on 7/18/16  6  STarted Dabrafenib 150 mg every 12 hours PO along with Mekinist 2 mg PO daily on 8/8/16, stopped on 8/22/16, resumed on 9/2/16 at dose reduction: Dabrafenib 75 mg BID along with Mekinist 2 mg PO daily, drug stopped on 9/3/16 secondary to nausea, vomiting, and pyrexia  7  STarted Vemurafenib 960 mg PO every 12 hours along with Cotellic 60 mg PO daily three weeks on and one week off on 9/16/16, stopped on 9/27/16 secondary to nausea, vomiting, grade 4 maculopapular rash, and high fevers  8  October 24, 2016 Started Dabrafenib 75 mg BID, Mekinist 1 mg PO every other day    Current Hematologic/ Oncologic Treatment:    Dabrafenib 150 mg BID plus Mekinist 1 mg PO daily every other day, started this regimen in November 2016  Test Results:    Imaging: No results found  Labs:   -1/5/2019  Hemoglobin 13 7, WBC 5 2, platelets 160, creatinine 0 71, ast 22, alt 20, tbilirubin 0 6    ROS: Review of Systems   Constitutional: Negative for activity change, appetite change, chills, fatigue and fever  HENT: Negative for mouth sores, sinus pressure and trouble swallowing  Eyes: Negative for visual disturbance  Respiratory: Negative for cough, chest tightness and shortness of breath  Cardiovascular: Positive for leg swelling (chronic RLE, unchanged)  Negative for chest pain and palpitations  Gastrointestinal: Negative for abdominal distention, abdominal pain, constipation, nausea and vomiting  Endocrine: Negative      Genitourinary: Negative  Negative for dysuria  Musculoskeletal: Negative  Negative for back pain and gait problem  Skin: Negative  Negative for rash  Allergic/Immunologic: Negative  Neurological: Negative  Negative for dizziness, light-headedness and headaches  Hematological: Negative  Negative for adenopathy  Does not bruise/bleed easily  Psychiatric/Behavioral: Negative  Negative for confusion  All other systems reviewed and are negative  Current Medications: Reviewed  Allergies: Reviewed  PMH/FH/SH:  Reviewed      Physical Exam:    Body surface area is 1 84 meters squared  Wt Readings from Last 3 Encounters:   01/11/19 78 5 kg (173 lb)   12/07/18 77 1 kg (170 lb)   11/05/18 77 8 kg (171 lb 8 oz)        Temp Readings from Last 3 Encounters:   01/11/19 98 °F (36 7 °C) (Oral)   12/07/18 97 8 °F (36 6 °C)   11/05/18 97 6 °F (36 4 °C)        BP Readings from Last 3 Encounters:   01/11/19 126/82   12/07/18 120/78   11/05/18 118/80         Pulse Readings from Last 3 Encounters:   01/11/19 72   12/07/18 80   11/05/18 88     @LASTSAO2(3)@      Physical Exam   Constitutional: She is oriented to person, place, and time  She appears well-developed and well-nourished  No distress  HENT:   Head: Normocephalic and atraumatic  Mouth/Throat: No oropharyngeal exudate  Eyes: Pupils are equal, round, and reactive to light  Conjunctivae and EOM are normal    Neck: Normal range of motion  Neck supple  Cardiovascular: Normal rate, regular rhythm and normal heart sounds  Pulmonary/Chest: Effort normal and breath sounds normal  No respiratory distress  She has no wheezes  She exhibits no tenderness  Abdominal: Soft  She exhibits no distension  There is no tenderness  There is no rebound  Musculoskeletal: Normal range of motion  She exhibits edema (RLE mild edema)  She exhibits no tenderness  Lymphadenopathy:     She has no cervical adenopathy     Neurological: She is alert and oriented to person, place, and time    Skin: Skin is warm and dry  No rash noted  Psychiatric: She has a normal mood and affect  Judgment normal    Vitals reviewed  Goals and Barriers:  Current Goal: Prolong Survival from Cancer  Barriers: None  Patient's Capacity to Self Care:  Patient fully able to self care      Code Status: [unfilled]  Advance Directive and Living Will:      Power of :

## 2019-01-28 ENCOUNTER — TELEPHONE (OUTPATIENT)
Dept: HEMATOLOGY ONCOLOGY | Facility: CLINIC | Age: 59
End: 2019-01-28

## 2019-01-28 NOTE — TELEPHONE ENCOUNTER
Shaheed Sparrow from [facility] called and needs an order faxed for patients CT of chest, abdomen and pelvis put through for appointment tomorrow      Shaheed Sparrow can be reached at [phone]  Fax: [phone]

## 2019-02-08 NOTE — PROGRESS NOTES
Hematology/Oncology Outpatient Follow- up Note  Arjun Dunn 63 y o  female MRN: @ Encounter: [id number]        Date:  2/8/2019        Assessment / Plan:    1  Metastatic Melanoma: currently on Dabrafenib 150 mg BID along with Mekinist 1 mg PO daily every other day  Thus far she has tolerated this regimen well (she had trouble with full dose BRAF/MEK combination- had fevers, arthralgias, etc )  She had repeat imaging on 1/, which continues to show stable disease, in general she has had partial response to treatment  Her labs were reviewed and are sufficient for treatment  She will follow up with us in about one month with a CBC, CMP, and LDH  She will be due for repeat imaging in May  She has been on targeted therapy for two years  We did discuss possibly taking a break from treatment at some point, which she does not want to do at this time      2  Adrenal insufficiency: being followed by [place] [facility]: she is currently on Hydrocortisone- 30 mg, she will try to taper to 25 mg  Subjective:   CC: follow up regarding metastatic melanoma      HPI:    Mrs Jarrett James is a 43-year-old female who presents to us for possible participation in our adjuvant clinical trial of Ipilimumab vs  Nivolumab  Mrs Jarrett James initially noticed a mole on her foot in December, however she felt it was a regular mole at that time  By spring the mole had grown and changed in appearance  She also had a few episodes where 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 had 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 causing the lesion to bleed  She presented to her dermatologist who debrided and resected the lesion  This was positive for malignant melanoma and had a Jimbo's level IV lesion with maximum thickness of 2 35 mm, it was ulcerated, and had a mitotic rate of 13 mm/m2  There was no evidence of lymphovascular invasion  On 7/28/15 she had a wide excision with sentinel lymph node biopsy 1/2 lymph nodes were positive   She subsequently had a PET/CT on 8/5/15, which did not reveal any distant metastatic disease, however she did have a hypermetabolic right popliteal fossa lymph node  On 8/13/15 she had a right groin lymph node dissection, 1/6 of the superficial groin nodes was positive for macrometastatic disease  All other lymph nodes removed were negative  The patient has stage IIIC disease  he patient was in the process of being worked up for the [facility] 209-238 clinical trial looking a Ipilimumab vs  Nivolumab in the adjuvant setting as she does have stage IIIC disease  A PET/CT scan, part of the staging requirements for the clinical trial, showed hypermetabolic activity posterior to the distal right femur  On 10/1/15 She had a resection of the femoral deep nodule and it was consistent with malignant melanoma  The patient was found to have recurrent disease in January 2016, she was unblinded from the [facility] 209-238 clinical trial and was randomized to Ipilimumab  She started on Pembrolizumab on 2/10/16  She was found to have disease progression again and started treatment with Dabrafenib and Mekinist on 8/8/16, she unfortunately had a lot of issues with pyrexia  We switched her to Vemurafenib along with Cotellic on 6/67/40  She developed high fevers, rash, nausea, and vomiting  In October 2016 we switched her back to Dabrafenib 75 mg BID along with Mekinist 1 mg PO every other day In November 2016  Interval History:    Overall Nadege Feldman is doing well  She has a good energy level with an ECOG performance status of zero  She has a good appetite and her wieght has been stable  She does feel as if she has the "winter blues " She has noted that her right leg edema is slightly worse, she is using her pump for her edema  She did have some mild visual changes and her glasses have changed  She denies fevers, chills ,CP, SOB, N/V, diarrhea, all other ROS are unremarkable  PFSH was reviewed        Cancer Staging:  metastatic melanoma to her lung, liver    BRAF: mutated    Previous Hematologic/ Oncologic History:    7/9/15 resection of lesion revealing malignant melanoma, Jimbo'es level IV lesion maximum thickness of 2 35 mm, ulcerated, mitotic rate of 13 mm/m2  1  7/28/15 wide excision of foot and right groin sentinel lymph node biopsy 1/2 lymph nodes were positive  2  8/13/15: right groin lymph node dissection 1/6 right superficial groin nodes were positive  All other nodes were negative  3  10/1/15 Radical resection of malignant soft tissue neoplasm of distal deep femoral region  4  -238 adjuvant clinical trial with Ipilimumab vs  Nivolumab, first treatment was 11/2/15, randomization was broken in January 2016, she was receiving Ipilimumab  5  2/12/16 adrenal insufficiency and hypophysitis- started hydrocortisone 20 mg in the am, 10 mg in the pm  Pembrolizumab 2 mg/kg every three weeks, first treatment was on 2/10/16, last dose was on 7/18/16  6  STarted Dabrafenib 150 mg every 12 hours PO along with Mekinist 2 mg PO daily on 8/8/16, stopped on 8/22/16, resumed on 9/2/16 at dose reduction: Dabrafenib 75 mg BID along with Mekinist 2 mg PO daily, drug stopped on 9/3/16 secondary to nausea, vomiting, and pyrexia  7  STarted Vemurafenib 960 mg PO every 12 hours along with Cotellic 60 mg PO daily three weeks on and one week off on 9/16/16, stopped on 9/27/16 secondary to nausea, vomiting, grade 4 maculopapular rash, and high fevers  8 October 24, 2016 Started Dabrafenib 75 mg BID, Mekinist 1 mg PO every other day    Current Hematologic/ Oncologic Treatment:    Dabrafenib 150 mg BID plus Mekinist 1 mg PO daily every other day, started this regimen in November 2016          Test Results:    Imaging: No results found      Labs:   Lab Results   Component Value Date    WBC 7 50 08/08/2016    HGB 13 3 08/08/2016    HCT 41 3 08/08/2016    MCV 89 08/08/2016     08/08/2016     Lab Results   Component Value Date     12/28/2015    K 3 8 08/08/2016     08/08/2016    CO2 33 (H) 08/08/2016    ANIONGAP 8 12/28/2015    BUN 5 08/08/2016    CREATININE 0 65 08/08/2016    GLUCOSE 87 12/28/2015    CALCIUM 9 2 08/08/2016    AST 12 08/08/2016    ALT 14 08/08/2016    ALKPHOS 83 08/08/2016    PROT 7 9 12/28/2015    BILITOT 0 21 12/28/2015    EGFR >60 0 08/08/2016         No results found for: SPEP, UPEP    No results found for: PSA    No results found for: CEA    No results found for:     No results found for: AFP    No results found for: IRON, TIBC, FERRITIN    No results found for: [id number]      ROS: Review of Systems   Constitutional: Negative  HENT: Negative  Eyes: Negative  Respiratory: Negative  Cardiovascular: Positive for leg swelling (right leg swelling)  Gastrointestinal: Negative  Endocrine: Negative  Genitourinary: Negative  Musculoskeletal: Negative  Skin: Negative  Allergic/Immunologic: Negative  Neurological: Negative  Hematological: Negative  Psychiatric/Behavioral: Negative  Current Medications: Reviewed  Allergies: Reviewed  PMH/FH/SH:  Reviewed      Physical Exam:    There is no height or weight on file to calculate BSA  Wt Readings from Last 3 Encounters:   01/11/19 78 5 kg (173 lb)   12/07/18 77 1 kg (170 lb)   11/05/18 77 8 kg (171 lb 8 oz)        Temp Readings from Last 3 Encounters:   01/11/19 98 °F (36 7 °C) (Oral)   12/07/18 97 8 °F (36 6 °C)   11/05/18 97 6 °F (36 4 °C)        BP Readings from Last 3 Encounters:   01/11/19 126/82   12/07/18 120/78   11/05/18 118/80         Pulse Readings from Last 3 Encounters:   01/11/19 72   12/07/18 80   11/05/18 88     @LASTSAO2(3)@      Physical Exam   Constitutional: She is oriented to person, place, and time  She appears well-developed and well-nourished  HENT:   Head: Normocephalic and atraumatic  Mouth/Throat: Oropharynx is clear and moist    Eyes: Pupils are equal, round, and reactive to light  Conjunctivae and EOM are normal    Neck: Normal range of motion  Neck supple  No thyromegaly present  Cardiovascular: Normal rate, regular rhythm and normal heart sounds  Pulmonary/Chest: Effort normal and breath sounds normal    Abdominal: Soft  Bowel sounds are normal  She exhibits no distension and no mass  There is no tenderness  Musculoskeletal: Normal range of motion  She exhibits no edema  Right leg edema, slightly worse today   Lymphadenopathy:     She has no cervical adenopathy  Neurological: She is alert and oriented to person, place, and time  She has normal reflexes  Skin: Skin is warm and dry  No erythema  Psychiatric: She has a normal mood and affect  Vitals reviewed  Goals and Barriers:  Current Goal: Prolong Survival from Cancer  Barriers: None  Patient's Capacity to Self Care:  Patient fully able to self care      Code Status: [unfilled]  Advance Directive and Living Will:      Power of :

## 2019-02-11 ENCOUNTER — OFFICE VISIT (OUTPATIENT)
Dept: HEMATOLOGY ONCOLOGY | Facility: CLINIC | Age: 59
End: 2019-02-11
Payer: COMMERCIAL

## 2019-02-11 VITALS
DIASTOLIC BLOOD PRESSURE: 80 MMHG | TEMPERATURE: 97.6 F | HEIGHT: 64 IN | SYSTOLIC BLOOD PRESSURE: 118 MMHG | OXYGEN SATURATION: 98 % | HEART RATE: 82 BPM | WEIGHT: 173 LBS | BODY MASS INDEX: 29.53 KG/M2 | RESPIRATION RATE: 14 BRPM

## 2019-02-11 DIAGNOSIS — C43.9 MALIGNANT MELANOMA, UNSPECIFIED SITE (HCC): ICD-10-CM

## 2019-02-11 DIAGNOSIS — C79.9 METASTATIC MELANOMA (HCC): ICD-10-CM

## 2019-02-11 DIAGNOSIS — C79.9 METASTATIC MALIGNANT MELANOMA (HCC): ICD-10-CM

## 2019-02-11 DIAGNOSIS — C78.00 METASTATIC MELANOMA TO LUNG, UNSPECIFIED LATERALITY (HCC): Primary | ICD-10-CM

## 2019-02-11 PROCEDURE — 99214 OFFICE O/P EST MOD 30 MIN: CPT | Performed by: SPECIALIST

## 2019-02-11 NOTE — LETTER
February 11, 2019     Swapnil Rojas MD  [id number]    Patient: Marco A Turk   YOB: 1960   Date of Visit: 2/11/2019       Dear Dr Elham Caceres: Thank you for referring Saqib Treadwell to me for evaluation  Below are my notes for this consultation  If you have questions, please do not hesitate to call me  I look forward to following your patient along with you  Sincerely,        Opal Hinkle MD        CC: No Recipients  Opal Hinkle MD  2/11/2019 10:36 AM  Sign at close encounter  Hematology/Oncology Outpatient Follow- up Note  Marco A Turk 63 y o  female MRN: @ Encounter: [id number]        Date:  2/8/2019        Assessment / Plan:    1  Metastatic Melanoma: currently on Dabrafenib 150 mg BID along with Mekinist 1 mg PO daily every other day  Thus far she has tolerated this regimen well (she had trouble with full dose BRAF/MEK combination- had fevers, arthralgias, etc )  She had repeat imaging on 1/, which continues to show stable disease, in general she has had partial response to treatment  Her labs were reviewed and are sufficient for treatment  She will follow up with us in about one month with a CBC, CMP, and LDH  She will be due for repeat imaging in May  She has been on targeted therapy for two years  We did discuss possibly taking a break from treatment at some point, which she does not want to do at this time      2  Adrenal insufficiency: being followed by [address] [place] [address] endocrinology: she is currently on Hydrocortisone- 30 mg, she will try to taper to 25 mg  Subjective:   CC: follow up regarding metastatic melanoma      HPI:    Mrs Sedrick Rivas is a 60-year-old female who presents to us for possible participation in our adjuvant clinical trial of Ipilimumab vs  Nivolumab  Mrs Sedrick Rivas initially noticed a mole on her foot in December, however she felt it was a regular mole at that time  By spring the mole had grown and changed in appearance   She also had a few episodes where 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 had 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 causing the lesion to bleed  She presented to her dermatologist who debrided and resected the lesion  This was positive for malignant melanoma and had a Jimbo's level IV lesion with maximum thickness of 2 35 mm, it was ulcerated, and had a mitotic rate of 13 mm/m2  There was no evidence of lymphovascular invasion  On 7/28/15 she had a wide excision with sentinel lymph node biopsy 1/2 lymph nodes were positive  She subsequently had a PET/CT on 8/5/15, which did not reveal any distant metastatic disease, however she did have a hypermetabolic right popliteal fossa lymph node  On 8/13/15 she had a right groin lymph node dissection, 1/6 of the superficial groin nodes was positive for macrometastatic disease  All other lymph nodes removed were negative  The patient has stage IIIC disease  he patient was in the process of being worked up for the [facility] 209-238 clinical trial looking a Ipilimumab vs  Nivolumab in the adjuvant setting as she does have stage IIIC disease  A PET/CT scan, part of the staging requirements for the clinical trial, showed hypermetabolic activity posterior to the distal right femur  On 10/1/15 She had a resection of the femoral deep nodule and it was consistent with malignant melanoma  The patient was found to have recurrent disease in January 2016, she was unblinded from the [facility] 209-238 clinical trial and was randomized to Ipilimumab  She started on Pembrolizumab on 2/10/16  She was found to have disease progression again and started treatment with Dabrafenib and Mekinist on 8/8/16, she unfortunately had a lot of issues with pyrexia  We switched her to Vemurafenib along with Cotellic on 4/18/72  She developed high fevers, rash, nausea, and vomiting  In October 2016 we switched her back to Dabrafenib 75 mg BID along with Mekinist 1 mg PO every other day In November 2016  Interval History:    Overall Kalia Plunkett is doing well   She has a good energy level with an ECOG performance status of zero  She has a good appetite and her wieght has been stable  She does feel as if she has the "winter blues " She has noted that her right leg edema is slightly worse, she is using her pump for her edema  She did have some mild visual changes and her glasses have changed  She denies fevers, chills ,CP, SOB, N/V, diarrhea, all other ROS are unremarkable  PFSH was reviewed  Cancer Staging:  metastatic melanoma to her lung, liver    BRAF: mutated    Previous Hematologic/ Oncologic History:    7/9/15 resection of lesion revealing malignant melanoma, Jimbo'es level IV lesion maximum thickness of 2 35 mm, ulcerated, mitotic rate of 13 mm/m2  1  7/28/15 wide excision of foot and right groin sentinel lymph node biopsy 1/2 lymph nodes were positive  2  8/13/15: right groin lymph node dissection 1/6 right superficial groin nodes were positive  All other nodes were negative  3  10/1/15 Radical resection of malignant soft tissue neoplasm of distal deep femoral region  4  [facility] [place] [place] 209-238 adjuvant clinical trial with Ipilimumab vs  Nivolumab, first treatment was 11/2/15, randomization was broken in January 2016, she was receiving Ipilimumab  5  2/12/16 adrenal insufficiency and hypophysitis- started hydrocortisone 20 mg in the am, 10 mg in the pm  Pembrolizumab 2 mg/kg every three weeks, first treatment was on 2/10/16, last dose was on 7/18/16  6  STarted Dabrafenib 150 mg every 12 hours PO along with Mekinist 2 mg PO daily on 8/8/16, stopped on 8/22/16, resumed on 9/2/16 at dose reduction: Dabrafenib 75 mg BID along with Mekinist 2 mg PO daily, drug stopped on 9/3/16 secondary to nausea, vomiting, and pyrexia  7  STarted Vemurafenib 960 mg PO every 12 hours along with Cotellic 60 mg PO daily three weeks on and one week off on 9/16/16, stopped on 9/27/16 secondary to nausea, vomiting, grade 4 maculopapular rash, and high fevers  8 October 24, 2016 Started Dabrafenib 75 mg BID, Mekinist 1 mg PO every other day    Current Hematologic/ Oncologic Treatment:    Dabrafenib 150 mg BID plus Mekinist 1 mg PO daily every other day, started this regimen in November 2016          Test Results:    Imaging: No results found  Labs:   Lab Results   Component Value Date    WBC 7 50 08/08/2016    HGB 13 3 08/08/2016    HCT 41 3 08/08/2016    MCV 89 08/08/2016     08/08/2016     Lab Results   Component Value Date     12/28/2015    K 3 8 08/08/2016     08/08/2016    CO2 33 (H) 08/08/2016    ANIONGAP 8 12/28/2015    BUN 5 08/08/2016    CREATININE 0 65 08/08/2016    GLUCOSE 87 12/28/2015    CALCIUM 9 2 08/08/2016    AST 12 08/08/2016    ALT 14 08/08/2016    ALKPHOS 83 08/08/2016    PROT 7 9 12/28/2015    BILITOT 0 21 12/28/2015    EGFR >60 0 08/08/2016         No results found for: SPEP, UPEP    No results found for: PSA    No results found for: CEA    No results found for:     No results found for: AFP    No results found for: IRON, TIBC, FERRITIN    No results found for: [id number]      ROS: Review of Systems   Constitutional: Negative  HENT: Negative  Eyes: Negative  Respiratory: Negative  Cardiovascular: Positive for leg swelling (right leg swelling)  Gastrointestinal: Negative  Endocrine: Negative  Genitourinary: Negative  Musculoskeletal: Negative  Skin: Negative  Allergic/Immunologic: Negative  Neurological: Negative  Hematological: Negative  Psychiatric/Behavioral: Negative  Current Medications: Reviewed  Allergies: Reviewed  PMH/FH/SH:  Reviewed      Physical Exam:    There is no height or weight on file to calculate BSA      Wt Readings from Last 3 Encounters:   01/11/19 78 5 kg (173 lb)   12/07/18 77 1 kg (170 lb)   11/05/18 77 8 kg (171 lb 8 oz)        Temp Readings from Last 3 Encounters:   01/11/19 98 °F (36 7 °C) (Oral)   12/07/18 97 8 °F (36 6 °C)   11/05/18 97 6 °F (36 4 °C)        BP Readings from Last 3 Encounters: 01/11/19 126/82   12/07/18 120/78   11/05/18 118/80         Pulse Readings from Last 3 Encounters:   01/11/19 72   12/07/18 80   11/05/18 88     @LASTSAO2(3)@      Physical Exam   Constitutional: She is oriented to person, place, and time  She appears well-developed and well-nourished  HENT:   Head: Normocephalic and atraumatic  Mouth/Throat: Oropharynx is clear and moist    Eyes: Pupils are equal, round, and reactive to light  Conjunctivae and EOM are normal    Neck: Normal range of motion  Neck supple  No thyromegaly present  Cardiovascular: Normal rate, regular rhythm and normal heart sounds  Pulmonary/Chest: Effort normal and breath sounds normal    Abdominal: Soft  Bowel sounds are normal  She exhibits no distension and no mass  There is no tenderness  Musculoskeletal: Normal range of motion  She exhibits no edema  Right leg edema, slightly worse today   Lymphadenopathy:     She has no cervical adenopathy  Neurological: She is alert and oriented to person, place, and time  She has normal reflexes  Skin: Skin is warm and dry  No erythema  Psychiatric: She has a normal mood and affect  Vitals reviewed  Goals and Barriers:  Current Goal: Prolong Survival from Cancer  Barriers: None  Patient's Capacity to Self Care:  Patient fully able to self care      Code Status: [unfilled]  Advance Directive and Living Will:      Power of :

## 2019-02-12 ENCOUNTER — TELEPHONE (OUTPATIENT)
Dept: HEMATOLOGY ONCOLOGY | Facility: CLINIC | Age: 59
End: 2019-02-12

## 2019-02-12 NOTE — TELEPHONE ENCOUNTER
Please fax printed [employer] and [facility] scripts to [employer] [phone]  Fax [phone]

## 2019-02-18 DIAGNOSIS — C43.9 MALIGNANT MELANOMA, UNSPECIFIED SITE (HCC): Primary | ICD-10-CM

## 2019-03-01 DIAGNOSIS — C43.9 MALIGNANT MELANOMA, UNSPECIFIED SITE (HCC): ICD-10-CM

## 2019-03-08 NOTE — PROGRESS NOTES
Hematology/Oncology Outpatient Follow- up Note  Hermelindo Painter 63 y o  female MRN: @ Encounter: [id number]        Date:  3/8/2019        Assessment / Plan:    1  Metastatic Melanoma: currently on Dabrafenib 150 mg BID along with Mekinist 1 mg PO daily every other day  Thus far she has tolerated this regimen well (she had trouble with full dose BRAF/MEK combination- had fevers, arthralgias, etc )  She had repeat imaging on 1/30, which continues to show stable disease, in general she has had partial response to treatment  Her labs were reviewed and are sufficient for treatment  She will follow up with us in about one month with a CBC, CMP, and LDH  She will be due for repeat imaging in April  She has been on targeted therapy for two years  We did discuss possibly taking a break from treatment at some point, which she does not want to do at this time      2  Adrenal insufficiency: being followed by [address] [place] [address] endocrinology: she is currently on Hydrocortisone- 30 mg, she will try to taper to 25 mg  Subjective:   CC: follow up regarding metastatic melanoma      HPI:    Mrs Gurpreet Chávez is a 81-year-old female who presents to us for possible participation in our adjuvant clinical trial of Ipilimumab vs  Nivolumab  Mrs Gurpreet Chávez initially noticed a mole on her foot in December, however she felt it was a regular mole at that time  By spring the mole had grown and changed in appearance  She also had a few episodes where 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 had 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 causing the lesion to bleed  She presented to her dermatologist who debrided and resected the lesion  This was positive for malignant melanoma and had a Jimbo's level IV lesion with maximum thickness of 2 35 mm, it was ulcerated, and had a mitotic rate of 13 mm/m2  There was no evidence of lymphovascular invasion  On 7/28/15 she had a wide excision with sentinel lymph node biopsy 1/2 lymph nodes were positive   She subsequently had a PET/CT on 8/5/15, which did not reveal any distant metastatic disease, however she did have a hypermetabolic right popliteal fossa lymph node  On 8/13/15 she had a right groin lymph node dissection, 1/6 of the superficial groin nodes was positive for macrometastatic disease  All other lymph nodes removed were negative  The patient has stage IIIC disease  he patient was in the process of being worked up for the [facility] 209-238 clinical trial looking a Ipilimumab vs  Nivolumab in the adjuvant setting as she does have stage IIIC disease  A PET/CT scan, part of the staging requirements for the clinical trial, showed hypermetabolic activity posterior to the distal right femur  On 10/1/15 She had a resection of the femoral deep nodule and it was consistent with malignant melanoma  The patient was found to have recurrent disease in January 2016, she was unblinded from the [facility] 209-238 clinical trial and was randomized to Ipilimumab  She started on Pembrolizumab on 2/10/16  She was found to have disease progression again and started treatment with Dabrafenib and Mekinist on 8/8/16, she unfortunately had a lot of issues with pyrexia  We switched her to Vemurafenib along with Cotellic on 4/12/72  She developed high fevers, rash, nausea, and vomiting  In October 2016 we switched her back to Dabrafenib 75 mg BID along with Mekinist 1 mg PO every other day In November 2016  Interval History:    Overall Juan R Wilkes is doing well  She has a good energy level with an ECOG performance status of zero  She does feel as if she has some winter blues  She has a good appetite and her weight has been stable  She has had more epistaxis, with a total of 4 recent nose bleeds, I have recommended she use Vasoline or a saline spray  She otherwise denies fevers, chills, CP, SOB, N/V, diarrhea, all other ROS are unremarkable  PFSH was reviewed        Cancer Staging:  metastatic melanoma to her lung, liver      BRAF: mutated    Previous Hematologic/ Oncologic History:    7/9/15 resection of lesion revealing malignant melanoma, Jimbo'es level IV lesion maximum thickness of 2 35 mm, ulcerated, mitotic rate of 13 mm/m2  1  7/28/15 wide excision of foot and right groin sentinel lymph node biopsy 1/2 lymph nodes were positive  2  8/13/15: right groin lymph node dissection 1/6 right superficial groin nodes were positive  All other nodes were negative  3  10/1/15 Radical resection of malignant soft tissue neoplasm of distal deep femoral region  4  -238 adjuvant clinical trial with Ipilimumab vs  Nivolumab, first treatment was 11/2/15, randomization was broken in January 2016, she was receiving Ipilimumab  5  2/12/16 adrenal insufficiency and hypophysitis- started hydrocortisone 20 mg in the am, 10 mg in the pm  Pembrolizumab 2 mg/kg every three weeks, first treatment was on 2/10/16, last dose was on 7/18/16  6  STarted Dabrafenib 150 mg every 12 hours PO along with Mekinist 2 mg PO daily on 8/8/16, stopped on 8/22/16, resumed on 9/2/16 at dose reduction: Dabrafenib 75 mg BID along with Mekinist 2 mg PO daily, drug stopped on 9/3/16 secondary to nausea, vomiting, and pyrexia  7  STarted Vemurafenib 960 mg PO every 12 hours along with Cotellic 60 mg PO daily three weeks on and one week off on 9/16/16, stopped on 9/27/16 secondary to nausea, vomiting, grade 4 maculopapular rash, and high fevers  8 October 24, 2016 Started Dabrafenib 75 mg BID, Mekinist 1 mg PO every other day      Current Hematologic/ Oncologic Treatment:    Dabrafenib 150 mg BID plus Mekinist 1 mg PO daily every other day, started this regimen in November 2016        Test Results:    Imaging: No results found      Labs:   Lab Results   Component Value Date    WBC 7 50 08/08/2016    HGB 13 3 08/08/2016    HCT 41 3 08/08/2016    MCV 89 08/08/2016     08/08/2016     Lab Results   Component Value Date     12/28/2015    K 3 8 08/08/2016     08/08/2016    CO2 33 (H) 08/08/2016    ANIONGAP 8 12/28/2015    BUN 5 08/08/2016 CREATININE 0 65 08/08/2016    GLUCOSE 87 12/28/2015    CALCIUM 9 2 08/08/2016    AST 12 08/08/2016    ALT 14 08/08/2016    ALKPHOS 83 08/08/2016    PROT 7 9 12/28/2015    BILITOT 0 21 12/28/2015    EGFR >60 0 08/08/2016         No results found for: SPEP, UPEP    No results found for: PSA    No results found for: CEA    No results found for:     No results found for: AFP    No results found for: IRON, TIBC, FERRITIN    No results found for: [id number]      ROS: Review of Systems   Constitutional: Negative  HENT: Positive for nosebleeds  Eyes: Negative  Respiratory: Negative  Cardiovascular: Negative  Gastrointestinal: Negative  Endocrine: Negative  Genitourinary: Negative  Musculoskeletal: Negative  Skin: Negative  Allergic/Immunologic: Negative  Neurological: Negative  Hematological: Negative  Psychiatric/Behavioral: Negative  Current Medications: Reviewed  Allergies: Reviewed  PMH/FH/SH:  Reviewed      Physical Exam:    There is no height or weight on file to calculate BSA  Wt Readings from Last 3 Encounters:   02/11/19 78 5 kg (173 lb)   01/11/19 78 5 kg (173 lb)   12/07/18 77 1 kg (170 lb)        Temp Readings from Last 3 Encounters:   02/11/19 97 6 °F (36 4 °C)   01/11/19 98 °F (36 7 °C) (Oral)   12/07/18 97 8 °F (36 6 °C)        BP Readings from Last 3 Encounters:   02/11/19 118/80   01/11/19 126/82   12/07/18 120/78         Pulse Readings from Last 3 Encounters:   02/11/19 82   01/11/19 72   12/07/18 80     @LASTSAO2(3)@      Physical Exam   Constitutional: She is oriented to person, place, and time  She appears well-developed and well-nourished  HENT:   Head: Normocephalic and atraumatic  Nose: Nose normal    Mouth/Throat: Oropharynx is clear and moist    Eyes: Pupils are equal, round, and reactive to light  Conjunctivae and EOM are normal    Neck: Normal range of motion  Neck supple     Cardiovascular: Normal rate, regular rhythm and normal heart sounds  Pulmonary/Chest: Effort normal and breath sounds normal    Abdominal: Soft  Bowel sounds are normal    Musculoskeletal: Normal range of motion  Neurological: She is alert and oriented to person, place, and time  She has normal reflexes  Skin: Skin is warm and dry  Psychiatric: She has a normal mood and affect  Judgment normal    Vitals reviewed  Goals and Barriers:  Current Goal: Prolong Survival from Cancer  Barriers: None  Patient's Capacity to Self Care:  Patient fully able to self care      Code Status: [unfilled]  Advance Directive and Living Will:      Power of :

## 2019-03-11 ENCOUNTER — OFFICE VISIT (OUTPATIENT)
Dept: HEMATOLOGY ONCOLOGY | Facility: CLINIC | Age: 59
End: 2019-03-11
Payer: COMMERCIAL

## 2019-03-11 VITALS
RESPIRATION RATE: 14 BRPM | SYSTOLIC BLOOD PRESSURE: 126 MMHG | DIASTOLIC BLOOD PRESSURE: 80 MMHG | TEMPERATURE: 97.9 F | HEIGHT: 64 IN | HEART RATE: 80 BPM | BODY MASS INDEX: 29.19 KG/M2 | WEIGHT: 171 LBS

## 2019-03-11 DIAGNOSIS — C79.9 METASTATIC MELANOMA (HCC): ICD-10-CM

## 2019-03-11 DIAGNOSIS — C78.00 METASTATIC MELANOMA TO LUNG, UNSPECIFIED LATERALITY (HCC): Primary | ICD-10-CM

## 2019-03-11 PROCEDURE — 99214 OFFICE O/P EST MOD 30 MIN: CPT | Performed by: SPECIALIST

## 2019-03-11 RX ORDER — MELATONIN
1000 DAILY
COMMUNITY

## 2019-03-11 NOTE — LETTER
March 11, 2019     Virgia Heimlich, MD  [id number]    Patient: Raman Law   YOB: 1960   Date of Visit: 3/11/2019       Dear Dr Spencer Arias: Thank you for referring Larissa Pino to me for evaluation  Below are my notes for this consultation  If you have questions, please do not hesitate to call me  I look forward to following your patient along with you  Sincerely,        Stephnaia Hughes MD        CC: No Recipients  Anitra Colorado PA-C  3/11/2019 10:28 AM  Sign at close encounter  Hematology/Oncology Outpatient Follow- up Note  Raman Law 63 y o  female MRN: @ Encounter: [id number]        Date:  3/8/2019        Assessment / Plan:    1  Metastatic Melanoma: currently on Dabrafenib 150 mg BID along with Mekinist 1 mg PO daily every other day  Thus far she has tolerated this regimen well (she had trouble with full dose BRAF/MEK combination- had fevers, arthralgias, etc )  She had repeat imaging on 1/30, which continues to show stable disease, in general she has had partial response to treatment  Her labs were reviewed and are sufficient for treatment  She will follow up with us in about one month with a CBC, CMP, and LDH  She will be due for repeat imaging in  April  She has been on targeted therapy for two years  We did discuss possibly taking a break from treatment at some point, which she does not want to do at this time      2  Adrenal insufficiency: being followed by [address] [place] [address] endocrinology: she is currently on Hydrocortisone- 30 mg, she will try to taper to 25 mg  Subjective:   CC: follow up regarding metastatic melanoma      HPI:    Mrs Michelle Bains is a 52-year-old female who presents to us for possible participation in our adjuvant clinical trial of Ipilimumab vs  Nivolumab  Mrs Michelle Bains initially noticed a mole on her foot in December, however she felt it was a regular mole at that time  By spring the mole had grown and changed in appearance   She also had a few episodes where 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 had 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 causing the lesion to bleed  She presented to her dermatologist who debrided and resected the lesion  This was positive for malignant melanoma and had a Jimbo's level IV lesion with maximum thickness of 2 35 mm, it was ulcerated, and had a mitotic rate of 13 mm/m2  There was no evidence of lymphovascular invasion  On 7/28/15 she had a wide excision with sentinel lymph node biopsy 1/2 lymph nodes were positive  She subsequently had a PET/CT on 8/5/15, which did not reveal any distant metastatic disease, however she did have a hypermetabolic right popliteal fossa lymph node  On 8/13/15 she had a right groin lymph node dissection, 1/6 of the superficial groin nodes was positive for macrometastatic disease  All other lymph nodes removed were negative  The patient has stage IIIC disease  he patient was in the process of being worked up for the [facility] 209-238 clinical trial looking a Ipilimumab vs  Nivolumab in the adjuvant setting as she does have stage IIIC disease  A PET/CT scan, part of the staging requirements for the clinical trial, showed hypermetabolic activity posterior to the distal right femur  On 10/1/15 She had a resection of the femoral deep nodule and it was consistent with malignant melanoma  The patient was found to have recurrent disease in January 2016, she was unblinded from the [facility] 209-238 clinical trial and was randomized to Ipilimumab  She started on Pembrolizumab on 2/10/16  She was found to have disease progression again and started treatment with Dabrafenib and Mekinist on 8/8/16, she unfortunately had a lot of issues with pyrexia  We switched her to Vemurafenib along with Cotellic on 5/68/42  She developed high fevers, rash, nausea, and vomiting  In October 2016 we switched her back to Dabrafenib 75 mg BID along with Mekinist 1 mg PO every other day In November 2016  Interval History:    Overall Cristobal Fuentes is doing well   She has a good energy level with an ECOG performance status of zero  She does feel as if she has some winter blues  She has a good appetite and her weight has been stable  She has had more epistaxis, with a total of 4 recent nose bleeds, I have recommended she use Vasoline or a saline spray  She otherwise denies fevers, chills, CP, SOB, N/V, diarrhea, all other ROS are unremarkable  PFSH was reviewed  Cancer Staging:  metastatic melanoma to her lung, liver      BRAF: mutated    Previous Hematologic/ Oncologic History:    7/9/15 resection of lesion revealing malignant melanoma, Jimbo'es level IV lesion maximum thickness of 2 35 mm, ulcerated, mitotic rate of 13 mm/m2  1  7/28/15 wide excision of foot and right groin sentinel lymph node biopsy 1/2 lymph nodes were positive  2  8/13/15: right groin lymph node dissection 1/6 right superficial groin nodes were positive  All other nodes were negative  3  10/1/15 Radical resection of malignant soft tissue neoplasm of distal deep femoral region  4  [facility] 209-238 adjuvant clinical trial with Ipilimumab vs  Nivolumab, first treatment was 11/2/15, randomization was broken in January 2016, she was receiving Ipilimumab  5  2/12/16 adrenal insufficiency and hypophysitis- started hydrocortisone 20 mg in the am, 10 mg in the pm  Pembrolizumab 2 mg/kg every three weeks, first treatment was on 2/10/16, last dose was on 7/18/16  6  STarted Dabrafenib 150 mg every 12 hours PO along with Mekinist 2 mg PO daily on 8/8/16, stopped on 8/22/16, resumed on 9/2/16 at dose reduction: Dabrafenib 75 mg BID along with Mekinist 2 mg PO daily, drug stopped on 9/3/16 secondary to nausea, vomiting, and pyrexia  7  STarted Vemurafenib 960 mg PO every 12 hours along with Cotellic 60 mg PO daily three weeks on and one week off on 9/16/16, stopped on 9/27/16 secondary to nausea, vomiting, grade 4 maculopapular rash, and high fevers  8 October 24, 2016 Started Dabrafenib 75 mg BID, Mekinist 1 mg PO every other day      Current Hematologic/ Oncologic Treatment:    Dabrafenib 150 mg BID plus Mekinist 1 mg PO daily every other day, started this regimen in November 2016        Test Results:    Imaging: No results found  Labs:   Lab Results   Component Value Date    WBC 7 50 08/08/2016    HGB 13 3 08/08/2016    HCT 41 3 08/08/2016    MCV 89 08/08/2016     08/08/2016     Lab Results   Component Value Date     12/28/2015    K 3 8 08/08/2016     08/08/2016    CO2 33 (H) 08/08/2016    ANIONGAP 8 12/28/2015    BUN 5 08/08/2016    CREATININE 0 65 08/08/2016    GLUCOSE 87 12/28/2015    CALCIUM 9 2 08/08/2016    AST 12 08/08/2016    ALT 14 08/08/2016    ALKPHOS 83 08/08/2016    PROT 7 9 12/28/2015    BILITOT 0 21 12/28/2015    EGFR >60 0 08/08/2016         No results found for: SPEP, UPEP    No results found for: PSA    No results found for: CEA    No results found for:     No results found for: AFP    No results found for: IRON, TIBC, FERRITIN    No results found for: [id number]      ROS: Review of Systems   Constitutional: Negative  HENT: Positive for nosebleeds  Eyes: Negative  Respiratory: Negative  Cardiovascular: Negative  Gastrointestinal: Negative  Endocrine: Negative  Genitourinary: Negative  Musculoskeletal: Negative  Skin: Negative  Allergic/Immunologic: Negative  Neurological: Negative  Hematological: Negative  Psychiatric/Behavioral: Negative  Current Medications: Reviewed  Allergies: Reviewed  PMH/FH/SH:  Reviewed      Physical Exam:    There is no height or weight on file to calculate BSA      Wt Readings from Last 3 Encounters:   02/11/19 78 5 kg (173 lb)   01/11/19 78 5 kg (173 lb)   12/07/18 77 1 kg (170 lb)        Temp Readings from Last 3 Encounters:   02/11/19 97 6 °F (36 4 °C)   01/11/19 98 °F (36 7 °C) (Oral)   12/07/18 97 8 °F (36 6 °C)        BP Readings from Last 3 Encounters:   02/11/19 118/80   01/11/19 126/82   12/07/18 120/78 Pulse Readings from Last 3 Encounters:   02/11/19 82   01/11/19 72   12/07/18 80     @LASTSAO2(3)@      Physical Exam   Constitutional: She is oriented to person, place, and time  She appears well-developed and well-nourished  HENT:   Head: Normocephalic and atraumatic  Nose: Nose normal    Mouth/Throat: Oropharynx is clear and moist    Eyes: Pupils are equal, round, and reactive to light  Conjunctivae and EOM are normal    Neck: Normal range of motion  Neck supple  Cardiovascular: Normal rate, regular rhythm and normal heart sounds  Pulmonary/Chest: Effort normal and breath sounds normal    Abdominal: Soft  Bowel sounds are normal    Musculoskeletal: Normal range of motion  Neurological: She is alert and oriented to person, place, and time  She has normal reflexes  Skin: Skin is warm and dry  Psychiatric: She has a normal mood and affect  Judgment normal    Vitals reviewed  Goals and Barriers:  Current Goal: Prolong Survival from Cancer  Barriers: None  Patient's Capacity to Self Care:  Patient fully able to self care      Code Status: [unfilled]  Advance Directive and Living Will:      Power of :

## 2019-04-03 ENCOUNTER — TELEPHONE (OUTPATIENT)
Dept: HEMATOLOGY ONCOLOGY | Facility: CLINIC | Age: 59
End: 2019-04-03

## 2019-04-09 ENCOUNTER — TELEPHONE (OUTPATIENT)
Dept: HEMATOLOGY ONCOLOGY | Facility: CLINIC | Age: 59
End: 2019-04-09

## 2019-04-26 ENCOUNTER — OFFICE VISIT (OUTPATIENT)
Dept: HEMATOLOGY ONCOLOGY | Facility: CLINIC | Age: 59
End: 2019-04-26
Payer: COMMERCIAL

## 2019-04-26 VITALS
SYSTOLIC BLOOD PRESSURE: 122 MMHG | HEIGHT: 64 IN | HEART RATE: 71 BPM | WEIGHT: 171 LBS | DIASTOLIC BLOOD PRESSURE: 80 MMHG | BODY MASS INDEX: 29.19 KG/M2 | TEMPERATURE: 98.7 F | RESPIRATION RATE: 14 BRPM

## 2019-04-26 DIAGNOSIS — C78.00 METASTATIC MELANOMA TO LUNG, UNSPECIFIED LATERALITY (HCC): Primary | ICD-10-CM

## 2019-04-26 DIAGNOSIS — C79.9 METASTATIC MELANOMA (HCC): ICD-10-CM

## 2019-04-26 PROCEDURE — 99214 OFFICE O/P EST MOD 30 MIN: CPT | Performed by: PHYSICIAN ASSISTANT

## 2019-06-05 ENCOUNTER — OFFICE VISIT (OUTPATIENT)
Dept: HEMATOLOGY ONCOLOGY | Facility: CLINIC | Age: 59
End: 2019-06-05
Payer: COMMERCIAL

## 2019-06-05 VITALS
WEIGHT: 171.5 LBS | BODY MASS INDEX: 29.28 KG/M2 | SYSTOLIC BLOOD PRESSURE: 122 MMHG | HEIGHT: 64 IN | RESPIRATION RATE: 14 BRPM | TEMPERATURE: 98.7 F | HEART RATE: 80 BPM | DIASTOLIC BLOOD PRESSURE: 78 MMHG

## 2019-06-05 DIAGNOSIS — C79.9 METASTATIC MELANOMA (HCC): ICD-10-CM

## 2019-06-05 DIAGNOSIS — C78.00 METASTATIC MELANOMA TO LUNG, UNSPECIFIED LATERALITY (HCC): Primary | ICD-10-CM

## 2019-06-05 PROCEDURE — 99214 OFFICE O/P EST MOD 30 MIN: CPT | Performed by: SPECIALIST

## 2019-06-13 DIAGNOSIS — C79.9 METASTATIC MALIGNANT MELANOMA (HCC): ICD-10-CM

## 2019-06-21 ENCOUNTER — TELEPHONE (OUTPATIENT)
Dept: HEMATOLOGY ONCOLOGY | Facility: CLINIC | Age: 59
End: 2019-06-21

## 2019-07-10 ENCOUNTER — TELEPHONE (OUTPATIENT)
Dept: HEMATOLOGY ONCOLOGY | Facility: CLINIC | Age: 59
End: 2019-07-10

## 2019-07-10 NOTE — TELEPHONE ENCOUNTER
Pt called to r/s her appt with Tatyana for 8 7 19 because she is getting her CT on 7 24 19 in [place], [place]  She will call the billing dept to give them the info to make sure its gets authorized in time    FYI

## 2019-07-15 DIAGNOSIS — C79.9 METASTATIC MALIGNANT MELANOMA (HCC): ICD-10-CM

## 2019-07-22 ENCOUNTER — TELEPHONE (OUTPATIENT)
Dept: HEMATOLOGY ONCOLOGY | Facility: CLINIC | Age: 59
End: 2019-07-22

## 2019-07-22 DIAGNOSIS — C43.9 MALIGNANT MELANOMA, UNSPECIFIED SITE (HCC): ICD-10-CM

## 2019-07-22 NOTE — TELEPHONE ENCOUNTER
Script pended to Dr Jeancarlos Moss to sign   To be electronically sent to [address] [place] [address]

## 2019-07-22 NOTE — TELEPHONE ENCOUNTER
Lety Michaels from [facility] called in regards to patient needing pre authorization for her CT scan scheduled for 7/24  Stated patient has already had to reschedule because there was no authorization   Emailed the above to oncology finance team  Call back [phone]

## 2019-07-22 NOTE — TELEPHONE ENCOUNTER
Call transferred from [facility], [address] [facility] calling for refill on Mekinist 0 5mg last given by Ajay [employer], PAC  Please fax to [phone], contact info on file

## 2019-07-30 ENCOUNTER — TELEPHONE (OUTPATIENT)
Dept: HEMATOLOGY ONCOLOGY | Facility: CLINIC | Age: 59
End: 2019-07-30

## 2019-07-30 NOTE — TELEPHONE ENCOUNTER
REBECCA for [facility] and wanted to know if she wanted us to send her cat scan disk back to her after it was put in Epic   Please notify Dr Garrick Mercado team

## 2019-08-01 DIAGNOSIS — C78.00 METASTATIC MELANOMA TO LUNG, UNSPECIFIED LATERALITY (HCC): Primary | ICD-10-CM

## 2019-08-01 DIAGNOSIS — C79.9 METASTATIC MELANOMA (HCC): ICD-10-CM

## 2019-08-07 ENCOUNTER — OFFICE VISIT (OUTPATIENT)
Dept: HEMATOLOGY ONCOLOGY | Facility: CLINIC | Age: 59
End: 2019-08-07
Payer: COMMERCIAL

## 2019-08-07 VITALS
RESPIRATION RATE: 18 BRPM | HEART RATE: 75 BPM | TEMPERATURE: 97.6 F | DIASTOLIC BLOOD PRESSURE: 86 MMHG | WEIGHT: 171 LBS | BODY MASS INDEX: 29.19 KG/M2 | SYSTOLIC BLOOD PRESSURE: 138 MMHG | OXYGEN SATURATION: 95 % | HEIGHT: 64 IN

## 2019-08-07 DIAGNOSIS — C78.00 METASTATIC MELANOMA TO LUNG, UNSPECIFIED LATERALITY (HCC): ICD-10-CM

## 2019-08-07 DIAGNOSIS — C79.9 METASTATIC MELANOMA (HCC): Primary | ICD-10-CM

## 2019-08-07 DIAGNOSIS — Z79.899 OTHER LONG TERM (CURRENT) DRUG THERAPY: ICD-10-CM

## 2019-08-07 PROCEDURE — 99214 OFFICE O/P EST MOD 30 MIN: CPT | Performed by: PHYSICIAN ASSISTANT

## 2019-08-07 NOTE — PROGRESS NOTES
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  She presented to her dermatologist who debrided and resected the lesion  This was positive for malignant melanoma and had a Jimbo's level IV lesion with maximum thickness of 2 35 mm, it was ulcerated, and had a mitotic rate of 13 mm/m2 n There was no evidence of lymphovascular invasion  On 7/28/15, she had a wide excision with sentinel lymph node biopsy 1/2 lymph nodes were positive  She subsequently had a PET/CT on 8/5/15, which did not reveal any distant metastatic disease, however she did have a hypermetabolic right popliteal fossa lymph node  On 8/13/15, she had a right groin lymph node dissection, 1/6 of the superficial groin nodes was positive for macrometastatic disease  All other lymph nodes removed were negative  The patient has stage IIIC disease  he patient was in the process of being worked up for the [facility] 209-238 clinical trial looking a Ipilimumab vs  Nivolumab in the adjuvant setting as she does have stage IIIC disease  A PET/CT scan, part of the staging requirements for the clinical trial, showed hypermetabolic activity posterior to the distal right femur  On 10/1/15, she had a resection of the femoral deep nodule and it was consistent with malignant melanoma    The patient was found to have recurrent disease in 2016, she was unblinded from the [facility] 209-238 clinical trial and was randomized to Ipilimumab  She started on Pembrolizumab on 2/10/16  She was found to have disease progression again and started treatment with Dabrafenib and Mekinist on 8/8/16, she unfortunately had a lot of issues with pyrexia  We switched her to Vemurafenib along with Cotellic on 1/93/61  She developed high fevers, rash, nausea, and vomiting  In October 2016 we switched her back to Dabrafenib 75 mg BID along with Mekinist 1 mg PO every other day in November 2016  Patient notes she was originally BRAF (-), but with [facility] 2nd opinion, she was found to be BRAF (+), and it was substantiated again with testing at [facility]  Current treatment :   Dabrafenib 150 mg po BID (QOD) with Mekinist 1 mg po QOD  Interval History:   8/7/19:  Patient is tolerating current dosage of meds, and prefers to stay on it at reduced dose, meaning every other day  She was offered holiday, but does not want to go off treatment  She tolerates treatment well  She has persistent lymphedema in Right leg  She noted she had necrotizing fasciitis, with extensive scarring on her legs  She denies any abnormal moles or skin lesions  Assessment / Plan:   1  Metastatic melanoma ([address] )  On Dabrafenib and Mekinist   We will continue QOD dosing, as well toleraated  We will continue Blood work wt CBC, CMP, LDH standing order every 3 months  We reviewed CT C/A/P from 7/25/19  We will repeat in 3 months  Dr Ashley Phillips appointment in 3 months post imaging  She is aware to call with any concerns  - CBC and differential; Future  - Comprehensive metabolic panel; Future  - LD,Blood; Future  - CBC and differential; Standing  - Comprehensive metabolic panel; Standing  - LD,Blood; Standing  - CBC and differential  - Comprehensive metabolic panel  - LD,Blood  - CT chest abdomen pelvis w contrast; Future    2  Other long term (current) drug therapy  On Dabrafenib 150 mg BID given QOD and Mekinist 1 mg QO  ECOG PS 0      Problem list:       Patient Active Problem List   Diagnosis    Metastatic melanoma ([facility] )    Metastatic melanoma to lung, unspecified laterality ([facility] )     PHYSICIAL EXAMINATION:     Vital Signs:   /86 (BP Location: Left arm)   Pulse 75   Temp 97 6 °F (36 4 °C) (Tympanic Core)   Resp 18   Ht 5' 4" (1 626 m)   Wt 77 6 kg (171 lb)   SpO2 95%   BMI 29 35 kg/m²    Body mass index is 29 35 kg/m²  Body surface area is 1 83 meters squared  No significant change compared to previous visit  Stable  Physical Exam   Constitutional: She is oriented to person, place, and time  She appears well-developed and well-nourished  No distress  HENT:   Head: Normocephalic and atraumatic  Mouth/Throat: Oropharynx is clear and moist  No oropharyngeal exudate  Eyes: Pupils are equal, round, and reactive to light  Conjunctivae and EOM are normal  Right eye exhibits no discharge  Left eye exhibits no discharge  No scleral icterus  Neck: Normal range of motion  Neck supple  No tracheal deviation present  No thyromegaly present  Cardiovascular: Normal rate, regular rhythm and normal heart sounds  Exam reveals no gallop and no friction rub  No murmur heard  Pulmonary/Chest: Effort normal and breath sounds normal  No stridor  No respiratory distress  She has no wheezes  She has no rales  Abdominal: Soft  Bowel sounds are normal  She exhibits no distension and no mass  There is no tenderness  There is no guarding  Genitourinary:   Genitourinary Comments: Deferred   Musculoskeletal: Normal range of motion  She exhibits no edema  Lymphadenopathy:     She has no cervical adenopathy  Neurological: She is alert and oriented to person, place, and time  No sensory deficit  Skin: Skin is warm and dry  No rash noted  No erythema  Right lateral foot with extensive scarring noted  Right medial lower leg with significant divot  Upper medial and Left leg with lateral skin graft area    All well healed  Psychiatric: She has a normal mood and affect  Her behavior is normal           PAST MEDICAL HISTORY:   has a past medical history of Necrotizing fasciitis ([address] )  PAST SURGICAL HISTORY:   has a past surgical history that includes Skin graft  CURRENT MEDICATIONS:     Current Outpatient Medications   Medication Sig Dispense Refill    amLODIPine-benazepril (LOTREL 5-10) 5-10 MG per capsule Take 1 capsule by mouth daily   cholecalciferol (VITAMIN D3) 1,000 units tablet Take 1,000 Units by mouth daily      dabrafenib (TAFINLAR) 75 MG capsule TAKE 2 CAPSULES BY MOUTH TWICE DAILY EVERY OTHER DAY 56 capsule 0    hydrocortisone (CORTEF) 10 mg tablet Take 20 mg by mouth Morning and before dinner       levothyroxine 100 mcg tablet Take 100 mcg by mouth daily   magnesium oxide (MAG-OX) 400 mg tablet Take by mouth 2 (two) times a day      MAGNESIUM OXIDE 400 PO Take by mouth      ondansetron (ZOFRAN) 8 mg tablet Take by mouth every 8 (eight) hours as needed for nausea or vomiting   Trametinib Dimethyl Sulfoxide (MEKINIST) 0 5 MG TABS TAKE 2 TABLETS BY MOUTH EVERY OTHER DAY IN THE EVENING 30 tablet 3     No current facility-administered medications for this visit  SOCIAL HISTORY:   reports that she has quit smoking  Her smoking use included cigarettes  She has never used smokeless tobacco  She reports that she drinks alcohol  She reports that she does not use drugs  FAMILY HISTORY:  family history is not on file  ALLERGIES:  is allergic to sulfa antibiotics  REVIEW OF SYSTEMS:  Review of Systems   Constitutional: Negative for activity change (Walks daily ), appetite change, fatigue, fever and unexpected weight change  HENT: Positive for nosebleeds (With blowing nose, notes little blood flowing out  Winter dryness  )  Negative for congestion, sore throat and trouble swallowing  Eyes: Negative for visual disturbance     Respiratory: Negative for cough, chest tightness, shortness of breath and wheezing  Cardiovascular: Negative for chest pain, palpitations and leg swelling  Gastrointestinal: Negative for abdominal distention, abdominal pain, blood in stool, constipation, diarrhea, nausea and vomiting  Endocrine: Positive for heat intolerance  Genitourinary: Negative for difficulty urinating, dysuria, hematuria, pelvic pain and vaginal bleeding  Musculoskeletal: Negative for arthralgias, back pain and myalgias  Skin: Negative for pallor and rash  Neurological: Negative for dizziness, weakness, numbness and headaches  Hematological: Negative for adenopathy  Does not bruise/bleed easily  Psychiatric/Behavioral: Negative for confusion and sleep disturbance (hot flashes)  The patient is not nervous/anxious  LAB:    Lab Results   Component Value Date    WBC 7 50 08/08/2016    HGB 13 3 08/08/2016    HCT 41 3 08/08/2016    MCV 89 08/08/2016     08/08/2016       Lab Results   Component Value Date     12/28/2015    SODIUM 142 08/08/2016    K 3 8 08/08/2016     08/08/2016    CO2 33 (H) 08/08/2016    ANIONGAP 8 12/28/2015    AGAP 8 08/08/2016    BUN 5 08/08/2016    CREATININE 0 65 08/08/2016    GLUC 100 08/08/2016    CALCIUM 9 2 08/08/2016    AST 12 08/08/2016    ALT 14 08/08/2016    ALKPHOS 83 08/08/2016    PROT 7 9 12/28/2015    TP 7 2 08/08/2016    BILITOT 0 21 12/28/2015    TBILI 0 50 08/08/2016    EGFR >60 0 08/08/2016   WBC 5100, Hgb 14 1, Platelets 740,124    IMAGING:    CT chest abdomen pelvis w contrast    (Results Pending)     7/25/19 with stable findings

## 2019-08-08 PROBLEM — Z79.899 OTHER LONG TERM (CURRENT) DRUG THERAPY: Status: ACTIVE | Noted: 2019-08-08

## 2019-08-15 DIAGNOSIS — C79.9 METASTATIC MALIGNANT MELANOMA (HCC): ICD-10-CM

## 2019-09-17 DIAGNOSIS — C79.9 METASTATIC MALIGNANT MELANOMA (HCC): ICD-10-CM

## 2019-10-29 ENCOUNTER — TELEPHONE (OUTPATIENT)
Dept: HEMATOLOGY ONCOLOGY | Facility: CLINIC | Age: 59
End: 2019-10-29

## 2019-10-29 NOTE — TELEPHONE ENCOUNTER
Sent email to onc fin team, to acquire prior auth for ct scan next week      [address]  [address] [facility], [address]    Patient wants to be contacted with auth number

## 2019-11-07 DIAGNOSIS — C43.9 MALIGNANT MELANOMA, UNSPECIFIED SITE (HCC): ICD-10-CM

## 2019-11-13 ENCOUNTER — OFFICE VISIT (OUTPATIENT)
Dept: HEMATOLOGY ONCOLOGY | Facility: CLINIC | Age: 59
End: 2019-11-13
Payer: COMMERCIAL

## 2019-11-13 ENCOUNTER — TELEPHONE (OUTPATIENT)
Dept: HEMATOLOGY ONCOLOGY | Facility: CLINIC | Age: 59
End: 2019-11-13

## 2019-11-13 VITALS
TEMPERATURE: 97.8 F | BODY MASS INDEX: 29.37 KG/M2 | OXYGEN SATURATION: 97 % | WEIGHT: 172 LBS | HEART RATE: 81 BPM | SYSTOLIC BLOOD PRESSURE: 140 MMHG | HEIGHT: 64 IN | DIASTOLIC BLOOD PRESSURE: 76 MMHG | RESPIRATION RATE: 16 BRPM

## 2019-11-13 DIAGNOSIS — C79.9 METASTATIC MELANOMA (HCC): Primary | ICD-10-CM

## 2019-11-13 DIAGNOSIS — C78.00 METASTATIC MELANOMA TO LUNG, UNSPECIFIED LATERALITY (HCC): ICD-10-CM

## 2019-11-13 PROCEDURE — 99214 OFFICE O/P EST MOD 30 MIN: CPT | Performed by: INTERNAL MEDICINE

## 2019-11-13 NOTE — PROGRESS NOTES
HEMATOLOGY / [address] [facility] NOTE    Primary Care Provider: Magalis Moyer  Referring Provider:    MRN: [id number]  : 1960    Reason for Encounter:  Chief Complaint   Patient presents with    Follow-up         History of Hematology / Oncology Illness:     Ambrosio Petit is a 63 y o  female who came in  to establish care with oncology       1, cutaneous melanoma, B Harjit positive    - 2014 :  Diagnosed right lower extremity melanoma  - 2015: Wide excision ,  lymph node positive  Stage IIIC disease, started PMS 2 9-to  clinical trial ipilimumab versus nivolumab, patient received ipilimumab  - 2016:  Disease relapsed on top of ipilimumab in the trial setting    - 2016:  Mervat Rameshon   - 2016:  disease progressed  Started  Dabrafenib and Mekinist on 16, she unfortunately had a lot of issues with pyrexia  We switched her to Vemurafenib along with Cotellic on   She developed high fevers, rash, nausea, and vomiting  In 2016 we switched her back to Dabrafenib 75 mg BID along with Mekinist 1 mg PO every other day in 2016  Assessment / Plan:       1  Metastatic melanoma ([facility] )  - review labs and imaging study with patient , there is no evidence for progression   Melanoma appears under control   Patient tolerating treatment no major issues   will continue current treatment  Will change surveillance to labs every 3 months, chest x-ray and CT scan alternate every 3 months  Patient will have x-ray in February then CT scan in May, will come in to follow with us in May  Patient lives 2 hours away  - XR chest pa & lateral; Future  - CT chest abdomen pelvis w contrast; Future    2   Metastatic melanoma to lung, unspecified laterality (HCC)     - XR chest pa & lateral; Future  - CT chest abdomen pelvis w contrast; Future         Made patient aware regarding side effects of chemotherapy, including but not limited to fatigue cytopenia, increased risk for infection, potential kidney, liver injuries neuropathies et al    Made patient aware to call MD or go to ED for any fever,  Chills, bleeding, easy bruise, unhealed wound, chest pain, abdominal pain et al                 25       minutes were spent face to face with patient with greater than 50% of the time spent in counseling or coordination of care including discussions of treatment instructions  All of the patient's questions were answered to their satisfactory during this discussion  Advised pt to call if there is any further questions  Interval History:     11/13/2019 :  Came for follow-up  Reported doing well, no new lumps bumps, no suspicious skin lesion  Tolerating medicine very well no fever  Problem list:     Patient Active Problem List   Diagnosis    Metastatic melanoma ([facility] )    Metastatic melanoma to lung, unspecified laterality ([facility] )    Other long term (current) drug therapy         PHYSICIAL EXAMINATION:     Vital Signs:   /76   Pulse 81   Temp 97 8 °F (36 6 °C) (Oral)   Resp 16   Ht 5' 4" (1 626 m)   Wt 78 kg (172 lb)   SpO2 97%   BMI 29 52 kg/m²   Body surface area is 1 83 meters squared  Ht Readings from Last 3 Encounters:   11/13/19 5' 4" (1 626 m)   08/07/19 5' 4" (1 626 m)   06/05/19 5' 4" (1 626 m)       Wt Readings from Last 3 Encounters:   11/13/19 78 kg (172 lb)   08/07/19 77 6 kg (171 lb)   06/05/19 77 8 kg (171 lb 8 oz)        Temp Readings from Last 3 Encounters:   11/13/19 97 8 °F (36 6 °C) (Oral)   08/07/19 97 6 °F (36 4 °C) (Tympanic Core)   06/05/19 98 7 °F (37 1 °C)        BP Readings from Last 3 Encounters:   11/13/19 140/76   08/07/19 138/86   06/05/19 122/78         Pulse Readings from Last 3 Encounters:   11/13/19 81   08/07/19 75   06/05/19 80         No major findings on physical examination      GEN: Alert, awake oriented x3, in no acute distress  HEENT- No pallor, icterus, cyanosis, no oral mucosal lesions,   LAD - no palpable cervical, clavicle, axillary, inguinal LAD  Heart- normal S1 S2, regular rate and rhythm, No murmur, rubs  Lungs- decreased breathing sound bilateral    Abdomen- soft, Non tender, bowel sounds present  Extremities- No cyanosis, clubbing, edema  Neuro- No focal neurological deficit           PAST MEDICAL HISTORY:   has a past medical history of Necrotizing fasciitis ([facility] )  PAST SURGICAL HISTORY:   has a past surgical history that includes Skin graft  CURRENT MEDICATIONS:   Current Outpatient Medications   Medication Sig Dispense Refill    amLODIPine-benazepril (LOTREL 5-10) 5-10 MG per capsule Take 1 capsule by mouth daily   cholecalciferol (VITAMIN D3) 1,000 units tablet Take 1,000 Units by mouth daily      dabrafenib (TAFINLAR) 75 MG capsule TAKE 2 CAPSULES BY MOUTH TWICE DAILY EVERY OTHER DAY 56 capsule 2    hydrocortisone (CORTEF) 10 mg tablet Take 20 mg by mouth Morning and before dinner       levothyroxine 100 mcg tablet Take 100 mcg by mouth daily   magnesium oxide (MAG-OX) 400 mg tablet Take by mouth 2 (two) times a day      MAGNESIUM OXIDE 400 PO Take by mouth      ondansetron (ZOFRAN) 8 mg tablet Take by mouth every 8 (eight) hours as needed for nausea or vomiting   Trametinib Dimethyl Sulfoxide (MEKINIST) 0 5 MG TABS TAKE 2 TABLETS BY MOUTH IN THE EVENING EVERY OTHER DAY 30 tablet 0     No current facility-administered medications for this visit  [unfilled]    SOCIAL HISTORY:   reports that she has quit smoking  Her smoking use included cigarettes  She has never used smokeless tobacco  She reports that she drinks alcohol  She reports that she does not use drugs  FAMILY HISTORY:  family history is not on file  ALLERGIES:  is allergic to sulfa antibiotics      REVIEW OF SYSTEMS:  Please note that a 14-point review of systems was performed to include Constitutional, HEENT, Respiratory, CVS, GI, , Musculoskeletal, Integumentary, Neurologic, Rheumatologic, Endocrinologic, Psychiatric, Lymphatic, and Hematologic/Oncologic systems were reviewed and are negative unless otherwise stated in HPI  Positive and negative findings pertinent to this evaluation are incorporated into the history of present illness  LAB:  Lab Results   Component Value Date    WBC 7 50 08/08/2016    HGB 13 3 08/08/2016    HCT 41 3 08/08/2016    MCV 89 08/08/2016     08/08/2016     Lab Results   Component Value Date     12/28/2015    SODIUM 142 08/08/2016    K 3 8 08/08/2016     08/08/2016    CO2 33 (H) 08/08/2016    ANIONGAP 8 12/28/2015    AGAP 8 08/08/2016    BUN 5 08/08/2016    CREATININE 0 65 08/08/2016    GLUC 100 08/08/2016    CALCIUM 9 2 08/08/2016    AST 12 08/08/2016    ALT 14 08/08/2016    ALKPHOS 83 08/08/2016    PROT 7 9 12/28/2015    TP 7 2 08/08/2016    BILITOT 0 21 12/28/2015    TBILI 0 50 08/08/2016    EGFR >60 0 08/08/2016       CBC with diff:       Invalid input(s):  RBC, TOTALCELLSCOUNTED, SEGS%, GRANS%, LYMPHS%, EOS%, BASO%, ABNEUT, ABGRANS, ABLYMPHS, ABMOMOS, ABEOS, ABBASO    CMP:      Invalid input(s): ALBUMIN    IMAGING:  XR chest pa & lateral    (Results Pending)   CT chest abdomen pelvis w contrast    (Results Pending)     Ct Abdomen Pelvis W Wo Contrast    Result Date: 11/12/2019  Narrative: 1 2 840 [id number]  2 460 2 [id number] 2 [id number] 1    Ct Chest W Wo Contrast    Result Date: 11/12/2019  Narrative: 1 2 840 [id number]  2 460 2 [id number] 1

## 2019-12-09 DIAGNOSIS — C79.9 METASTATIC MALIGNANT MELANOMA (HCC): ICD-10-CM

## 2019-12-11 DIAGNOSIS — C43.9 MALIGNANT MELANOMA, UNSPECIFIED SITE (HCC): ICD-10-CM

## 2020-01-10 DIAGNOSIS — C43.9 MALIGNANT MELANOMA, UNSPECIFIED SITE (HCC): ICD-10-CM

## 2020-01-15 ENCOUNTER — TELEPHONE (OUTPATIENT)
Dept: HEMATOLOGY ONCOLOGY | Facility: CLINIC | Age: 60
End: 2020-01-15

## 2020-01-15 ENCOUNTER — DOCUMENTATION (OUTPATIENT)
Dept: HEMATOLOGY ONCOLOGY | Facility: CLINIC | Age: 60
End: 2020-01-15

## 2020-01-15 NOTE — TELEPHONE ENCOUNTER
Pharmacy called to let Dr Saeid Perez know that the drug tafinlar has been denied, he can call [phone] and [facility]#[id number]

## 2020-01-15 NOTE — PROGRESS NOTES
1/13/2020  Received notification from clinical pt will be starting tafinlar 150 mg b i d  Pt has [address]  ID # [id number]  BIN #[id number]  [facility] # [id number]    Submitted for auth through cover my meds  1/15/2020 received denial from [employer]  Per the denial tafinlar has been denied since there is not any documentation of the pt having the BRAF V600K or V600E mutation      Notified clinical, [facility], and finance

## 2020-01-17 ENCOUNTER — TELEPHONE (OUTPATIENT)
Dept: HEMATOLOGY ONCOLOGY | Facility: CLINIC | Age: 60
End: 2020-01-17

## 2020-01-17 NOTE — TELEPHONE ENCOUNTER
Patient called in to let us know that her health insurance "is requesting detailed information of the following medications: Mekinist 0 5 mg and Tafinlar 75 mg"  Please call and advise  Waylon Chaudhry

## 2020-01-20 ENCOUNTER — TELEPHONE (OUTPATIENT)
Dept: HEMATOLOGY ONCOLOGY | Facility: CLINIC | Age: 60
End: 2020-01-20

## 2020-01-20 NOTE — TELEPHONE ENCOUNTER
Patient called to follow up on the prior auth that is needed for the Mekinist 0 5 mg  I did advise that it was sent to our [employer]  She stated that she would like like know if the imaging order by Dr Opal Jimenez will need a prior authorization as well  Best call back [phone]

## 2020-01-20 NOTE — TELEPHONE ENCOUNTER
Vincent Byrne RN has sent all related pathology to [facility]) related to getting the patients Abhishek approved

## 2020-01-23 ENCOUNTER — TELEPHONE (OUTPATIENT)
Dept: HEMATOLOGY ONCOLOGY | Facility: CLINIC | Age: 60
End: 2020-01-23

## 2020-01-23 NOTE — TELEPHONE ENCOUNTER
I received a call from Tod Ziegler from [employer] requesting a update on the prior authorization for the medication Mekinist  Email sent to the [employer]

## 2020-01-24 ENCOUNTER — DOCUMENTATION (OUTPATIENT)
Dept: HEMATOLOGY ONCOLOGY | Facility: CLINIC | Age: 60
End: 2020-01-24

## 2020-01-24 DIAGNOSIS — C79.9 METASTATIC MALIGNANT MELANOMA (HCC): ICD-10-CM

## 2020-01-24 NOTE — PROGRESS NOTES
1/20/2020 received notification from clinical we are to appeal the denial since they were able to obtain the pathology report  Pt has [address]  ID # [id number]  [facility] #[id number]  GRP # [id number]    1/21/2020 sent appeal letter to Dr Chris Berry for review and signature  Received it back and submitted the appeal     1/24/2020 received the approval letter from [facility]  Per the approval letter Tova Torres is valid from 11/23/2020 through 1/22/2021    Notified clinical, [place], and finance

## 2020-01-27 ENCOUNTER — TELEPHONE (OUTPATIENT)
Dept: HEMATOLOGY ONCOLOGY | Facility: CLINIC | Age: 60
End: 2020-01-27

## 2020-01-27 DIAGNOSIS — C79.9 METASTATIC MALIGNANT MELANOMA (HCC): Primary | ICD-10-CM

## 2020-01-27 NOTE — TELEPHONE ENCOUNTER
Call transferred from Nj Kohli, [address] [facility] from  stating had taken her last dose of Tafinlar and Mekinist last week and now with fever 100 8, symptoms of chills on/off not sure if this is related to being off the medication  Able to eat and drink okay  Came home from work and not feeling well today  You  on his cell at  [phone]  He is also anxious about the recent denial of these medications, since she has not had a dose in over a weeks time  Please discuss with Dr Jose J Sheets and call  back

## 2020-01-27 NOTE — TELEPHONE ENCOUNTER
Called and spoke with patient  I notified him that the Viann Maximoe has been approved as of this morning and was sent over to her pharmacy which it has been filled through previously  Advised him they should be calling within the next day or two for shipment  If he does not hear from them within the next few days to give me a call at the office so I can follow up with the pharmacy  I did go through the denial process and how we had to find all her pathology slides from years ago to prove her BRAF mutation which took a little time  I also stated that I have not heard of the medication causing a fever when the patient stopped taking  So I did suggest that the patient be seen at an urgent care or PCP today to be evaluated  I am concerned with possible underlying infection  He verbalized understanding and 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

## 2020-01-28 ENCOUNTER — DOCUMENTATION (OUTPATIENT)
Dept: HEMATOLOGY ONCOLOGY | Facility: CLINIC | Age: 60
End: 2020-01-28

## 2020-01-28 NOTE — PROGRESS NOTES
1-28-20    For patient Vinod Ortiz  [birth year]    Patient has been enrolled in the [employer]  [address] [place] [address] 1-28-20    ID#  [id number]  BIN#  [id number]  PCN#  [id number]  GRP#  [id number]  Copay $ 25 with a $15,000 limit per year      EPIC NOTED, EMAIL TO TEAM

## 2020-01-29 ENCOUNTER — TELEPHONE (OUTPATIENT)
Dept: HEMATOLOGY ONCOLOGY | Facility: CLINIC | Age: 60
End: 2020-01-29

## 2020-01-29 NOTE — TELEPHONE ENCOUNTER
Received task , called patient back and left a message to call us back here at the [facility]  Patient did call me back and stated that she with [employer] was not approved, still in waiting process  Patient unsure of what to do  She expressed frustration during the call  Please call her back at [phone]

## 2020-01-29 NOTE — TELEPHONE ENCOUNTER
Patient is calling in indicating that she is having an issue receiving her prescription of Mekinist due to her insurance     She uses [facility]

## 2020-01-31 ENCOUNTER — DOCUMENTATION (OUTPATIENT)
Dept: HEMATOLOGY ONCOLOGY | Facility: CLINIC | Age: 60
End: 2020-01-31

## 2020-01-31 DIAGNOSIS — C43.9 MALIGNANT MELANOMA, UNSPECIFIED SITE (HCC): ICD-10-CM

## 2020-01-31 NOTE — PROGRESS NOTES
1/29/2020 received notification from [place] [facility] that the [address] now needs auth  Pt has [address]  ID # [id number]  BIN [id number]  GRP # [id number]    [phone]    Cover my meds was down  Called [employer] @ 1:29 ([phone]) spoke with [facility]  She started the auth process and asked that the clinicals be faxed to [phone]    Ref case # [id number]     1/31/2020 received the approval letter from [facility]   Per the approval letter Jeri Warren is valid from 11/30/2019 through 1/29/2021    Notified clinical, alliance, and finance

## 2020-02-03 DIAGNOSIS — C43.9 MALIGNANT MELANOMA, UNSPECIFIED SITE (HCC): ICD-10-CM

## 2020-04-02 ENCOUNTER — TELEPHONE (OUTPATIENT)
Dept: HEMATOLOGY ONCOLOGY | Facility: CLINIC | Age: 60
End: 2020-04-02

## 2020-04-20 DIAGNOSIS — C79.9 METASTATIC MALIGNANT MELANOMA (HCC): ICD-10-CM

## 2020-04-23 ENCOUNTER — TELEPHONE (OUTPATIENT)
Dept: HEMATOLOGY ONCOLOGY | Facility: CLINIC | Age: 60
End: 2020-04-23

## 2020-04-23 ENCOUNTER — TELEPHONE (OUTPATIENT)
Dept: HEMATOLOGY ONCOLOGY | Facility: MEDICAL CENTER | Age: 60
End: 2020-04-23

## 2020-04-23 DIAGNOSIS — C79.9 METASTATIC MALIGNANT MELANOMA (HCC): ICD-10-CM

## 2020-05-04 ENCOUNTER — TELEPHONE (OUTPATIENT)
Dept: HEMATOLOGY ONCOLOGY | Facility: CLINIC | Age: 60
End: 2020-05-04

## 2020-05-08 ENCOUNTER — TELEPHONE (OUTPATIENT)
Dept: HEMATOLOGY ONCOLOGY | Facility: CLINIC | Age: 60
End: 2020-05-08

## 2020-05-11 ENCOUNTER — TELEPHONE (OUTPATIENT)
Dept: HEMATOLOGY ONCOLOGY | Facility: CLINIC | Age: 60
End: 2020-05-11

## 2020-05-26 ENCOUNTER — TELEPHONE (OUTPATIENT)
Dept: HEMATOLOGY ONCOLOGY | Facility: CLINIC | Age: 60
End: 2020-05-26

## 2020-05-26 DIAGNOSIS — C43.9 MALIGNANT MELANOMA, UNSPECIFIED SITE (HCC): ICD-10-CM

## 2020-06-02 ENCOUNTER — OFFICE VISIT (OUTPATIENT)
Dept: HEMATOLOGY ONCOLOGY | Facility: CLINIC | Age: 60
End: 2020-06-02
Payer: COMMERCIAL

## 2020-06-02 ENCOUNTER — DOCUMENTATION (OUTPATIENT)
Dept: HEMATOLOGY ONCOLOGY | Facility: CLINIC | Age: 60
End: 2020-06-02

## 2020-06-02 ENCOUNTER — TELEPHONE (OUTPATIENT)
Dept: HEMATOLOGY ONCOLOGY | Facility: CLINIC | Age: 60
End: 2020-06-02

## 2020-06-02 VITALS
HEIGHT: 64 IN | DIASTOLIC BLOOD PRESSURE: 88 MMHG | SYSTOLIC BLOOD PRESSURE: 136 MMHG | OXYGEN SATURATION: 95 % | BODY MASS INDEX: 29.71 KG/M2 | WEIGHT: 174 LBS | RESPIRATION RATE: 16 BRPM | TEMPERATURE: 98.4 F | HEART RATE: 85 BPM

## 2020-06-02 DIAGNOSIS — C79.9 METASTATIC MELANOMA (HCC): Primary | ICD-10-CM

## 2020-06-02 DIAGNOSIS — C79.9 METASTATIC MALIGNANT MELANOMA (HCC): ICD-10-CM

## 2020-06-02 PROCEDURE — 99214 OFFICE O/P EST MOD 30 MIN: CPT | Performed by: INTERNAL MEDICINE

## 2020-07-02 ENCOUNTER — TELEPHONE (OUTPATIENT)
Dept: HEMATOLOGY ONCOLOGY | Facility: CLINIC | Age: 60
End: 2020-07-02

## 2020-07-02 DIAGNOSIS — R11.2 NAUSEA AND VOMITING, INTRACTABILITY OF VOMITING NOT SPECIFIED, UNSPECIFIED VOMITING TYPE: ICD-10-CM

## 2020-07-02 DIAGNOSIS — Z79.899 OTHER LONG TERM (CURRENT) DRUG THERAPY: ICD-10-CM

## 2020-07-02 DIAGNOSIS — R10.9 ABDOMINAL PAIN, UNSPECIFIED ABDOMINAL LOCATION: ICD-10-CM

## 2020-07-02 DIAGNOSIS — C79.9 METASTATIC MELANOMA (HCC): Primary | ICD-10-CM

## 2020-07-02 NOTE — TELEPHONE ENCOUNTER
Patient reports she has been experiencing moderate continuous nausea for the last 2 week  Fatigue is moderate  Patient reports feeling a "knot sensation" over hiatal hernia  Has had increased belching  Stated that she feels like she felt when she was on Keytruda  Please review with Dr Sepideh Mcgregor     Patient's call back # [phone] (M)

## 2020-07-02 NOTE — TELEPHONE ENCOUNTER
Patient called stating that she has been on  dabrafenib (Tafinlar) 75 MG capsule and Trametinib Dimethyl Sulfoxide (Mekinist) 0 5 MG TABS for the past 3 years  She states she has been nauseous for the past 2 weeks and think it may be related to the medication  Best call back [phone]

## 2020-07-02 NOTE — TELEPHONE ENCOUNTER
Spoke with Dr Yolette Gao  Stated he would like patient to obtain a CT C/A/P w contrast  Order placed  I called the patient to discuss her symptoms  I asked if she has Zofran at home as it is on her medication list for us, she said she does have it and tried using it last night with improvement in her nausea  I stated for her to continue taking it to help alleviate her symptoms for now  I explained because of her having intermittent abdominal pain Dr Yolette Gao favors to get another CT scan  Patient [facility] closer to her home and not a [facility]  Order script were placed in the mail for patient with sticky note on where to fax results  Patient will then call us so we can set up a sooner appointment after CT is done and resulted so he can go over it with her due to her new onset symptoms  Patient was told to proceed to the ER if she is unable to eat or intake liquids for more than 12 hours, or if her abdominal pain persists/ gets worse

## 2020-07-08 ENCOUNTER — TELEPHONE (OUTPATIENT)
Dept: HEMATOLOGY ONCOLOGY | Facility: CLINIC | Age: 60
End: 2020-07-08

## 2020-07-08 NOTE — TELEPHONE ENCOUNTER
Please fax CT order to [facility] at [phone]   Please call patient to confirm this is done at [phone]

## 2020-07-24 ENCOUNTER — TELEPHONE (OUTPATIENT)
Dept: SURGICAL ONCOLOGY | Facility: CLINIC | Age: 60
End: 2020-07-24

## 2020-07-24 NOTE — TELEPHONE ENCOUNTER
Dr Caryle Service called and spoke with patients , I want present during phone call  Deedee Johnson stated how they only saw one present brain mass and all other scans were unchanged  They are planning for surgery on Monday 7/27/20 to remove the tumor  Dr Caryle Service discussed with him depending on if this tumor is in fact metastatic melanoma and if they can remove it all will depended on how we proceed with treatment  Dr Caryle Service did review possible interventions/treatments post op  Deedee Johnson verbalized understanding and voiced his appreciation for the return phone call form Dr Caryle Service Dr Caryle Service asked for them to keep us updated on [place]'s status, and once she is recovered and discharged they should give our office a call so we can bring her in the office to discuss what step are next  Deedee Johnson stated he will keep in touch with our office

## 2020-07-24 NOTE — TELEPHONE ENCOUNTER
Spoke to  of [facility]  His name is Johnny Garcia  Pt is inpatient in ICU at [facility] since Wednesday and is scheduled for brain surgery on Monday due to tumor on right frontal lobe - she does see Celine Bernal for Melanoma  Pt's  is asking to speak to Dr Celine Bernal - he has some questions and would really appreciate a call  He can be reached at    [phone]  Again this is Johnny Garcia - please call anytime soon

## 2020-08-05 ENCOUNTER — TELEPHONE (OUTPATIENT)
Dept: HEMATOLOGY ONCOLOGY | Facility: MEDICAL CENTER | Age: 60
End: 2020-08-05

## 2020-08-05 NOTE — TELEPHONE ENCOUNTER
Jenny from [facility] called in as she wanted to know if a prior [facility] was ever sent as patient is schedule for a Pet Scan    a good call back number is [phone]

## 2020-08-05 NOTE — TELEPHONE ENCOUNTER
Attempted to call number that was provided for Ming at [place] (twice)  Phone instantly goes to a generic mailbox  No message was left  Please advise Shubham Mckenna that Dr Hernán Cisneros did not order a PET scan, therefore no authorization would have been obtained

## 2021-01-28 ENCOUNTER — DOCUMENTATION (OUTPATIENT)
Dept: OTHER | Facility: HOSPITAL | Age: 61
End: 2021-01-28

## 2021-01-28 NOTE — PROGRESS NOTES
[place] Kovacs ([birth year]) is subject [id number] on clinical trial BMS  974; re-consent is due for this trial for protocol with ICF approval date 1/18/2021  Chong Steward document mailed to patient along with letter detailing remote re-consent procedure

## 2021-01-28 NOTE — PROGRESS NOTES
Kelley Grace ([birth year]) is subject [id number] is subject [phone] on clinical trial [facility] [place]  032; re-consent is due for this trial for protocol with ICF approval date 1/18/2021    Consent document mailed to patient along with letter detailing remote re-consent procedure

## 2021-03-02 ENCOUNTER — TELEPHONE (OUTPATIENT)
Dept: OTHER | Facility: HOSPITAL | Age: 61
End: 2021-03-02

## 2021-03-02 NOTE — TELEPHONE ENCOUNTER
Chirstopher Malave ([birth year]) is subject [id number] [place] clinical trial BMS  097; re-consent is due for this trial for protocol with ICF approval date 1/18/2021  Mike Lawson both home and mobile phone numbers, no answer on either  Email sent to pt on 2/29/21 regarding re-consent, no reply

## 2021-03-30 ENCOUNTER — DOCUMENTATION (OUTPATIENT)
Dept: OTHER | Facility: HOSPITAL | Age: 61
End: 2021-03-30

## 2021-03-30 NOTE — PROGRESS NOTES
Raghavendra Springer ([birth year]) is subject [id number] [place] clinical trial BMS  690; re-consent is due for this trial for protocol with ICF approval date 0/17/9365   A certified letter was mailed on 3/30/21 informing pt that if a response to this letter is not received, she will be withdrawn from the study with no further study-related communication on April 8, 2021

## 2021-04-29 ENCOUNTER — DOCUMENTATION (OUTPATIENT)
Dept: OTHER | Facility: HOSPITAL | Age: 61
End: 2021-04-29

## 2021-04-29 NOTE — PROGRESS NOTES
Rosanne Mery is subject [id number] on clinical trial BMS  238  In a telephone call on 4/28/21, she asked to be withdrawn from the study